# Patient Record
Sex: MALE | Race: WHITE | NOT HISPANIC OR LATINO | Employment: OTHER | URBAN - METROPOLITAN AREA
[De-identification: names, ages, dates, MRNs, and addresses within clinical notes are randomized per-mention and may not be internally consistent; named-entity substitution may affect disease eponyms.]

---

## 2017-03-02 ENCOUNTER — ALLSCRIPTS OFFICE VISIT (OUTPATIENT)
Dept: OTHER | Facility: OTHER | Age: 82
End: 2017-03-02

## 2017-05-08 ENCOUNTER — GENERIC CONVERSION - ENCOUNTER (OUTPATIENT)
Dept: OTHER | Facility: OTHER | Age: 82
End: 2017-05-08

## 2017-05-14 ENCOUNTER — HOSPITAL ENCOUNTER (EMERGENCY)
Facility: HOSPITAL | Age: 82
Discharge: HOME | End: 2017-05-14
Attending: EMERGENCY MEDICINE | Admitting: EMERGENCY MEDICINE
Payer: MEDICARE

## 2017-05-14 VITALS
HEART RATE: 73 BPM | WEIGHT: 197 LBS | TEMPERATURE: 98.3 F | OXYGEN SATURATION: 97 % | SYSTOLIC BLOOD PRESSURE: 137 MMHG | RESPIRATION RATE: 18 BRPM | BODY MASS INDEX: 29.18 KG/M2 | HEIGHT: 69 IN | DIASTOLIC BLOOD PRESSURE: 61 MMHG

## 2017-05-14 DIAGNOSIS — S01.91XA LACERATION OF HEAD: Primary | ICD-10-CM
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
## 2017-05-14 PROCEDURE — 99284 EMERGENCY DEPT VISIT MOD MDM: CPT

## 2017-05-14 RX ORDER — DOXAZOSIN MESYLATE 4 MG/1
4 TABLET ORAL
COMMUNITY

## 2017-05-14 RX ORDER — LIDOCAINE HYDROCHLORIDE AND EPINEPHRINE 10; 10 MG/ML; UG/ML
1 INJECTION, SOLUTION INFILTRATION; PERINEURAL ONCE
Status: COMPLETED | OUTPATIENT
Start: 2017-05-14 | End: 2017-05-14

## 2017-05-14 RX ORDER — BACITRACIN, NEOMYCIN, POLYMYXIN B 400; 3.5; 5 [USP'U]/G; MG/G; [USP'U]/G
1 OINTMENT TOPICAL ONCE
Status: COMPLETED | OUTPATIENT
Start: 2017-05-14 | End: 2017-05-14

## 2017-05-14 RX ORDER — PREDNISONE 10 MG/1
10 TABLET ORAL 2 TIMES DAILY
COMMUNITY

## 2017-05-14 RX ORDER — OMEPRAZOLE 20 MG/1
20 CAPSULE, DELAYED RELEASE ORAL 2 TIMES DAILY
COMMUNITY

## 2017-05-14 RX ORDER — FUROSEMIDE 40 MG/1
40 TABLET ORAL 2 TIMES DAILY
COMMUNITY

## 2017-05-14 RX ORDER — ACETAMINOPHEN 500 MG
500 TABLET ORAL EVERY 6 HOURS PRN
COMMUNITY

## 2017-05-14 RX ADMIN — LIDOCAINE HYDROCHLORIDE,EPINEPHRINE BITARTRATE 1 ML: 10; .01 INJECTION, SOLUTION INFILTRATION; PERINEURAL at 15:37

## 2017-05-14 RX ADMIN — BACITRACIN, NEOMYCIN, POLYMYXIN B 1 SMALL APPLICATION: 400; 3.5; 5 OINTMENT TOPICAL at 15:37

## 2017-05-24 ENCOUNTER — ANESTHESIA (OUTPATIENT)
Dept: GASTROENTEROLOGY | Facility: HOSPITAL | Age: 82
End: 2017-05-24
Payer: MEDICARE

## 2017-05-24 ENCOUNTER — ANESTHESIA EVENT (OUTPATIENT)
Dept: GASTROENTEROLOGY | Facility: HOSPITAL | Age: 82
End: 2017-05-24
Payer: MEDICARE

## 2017-05-24 ENCOUNTER — HOSPITAL ENCOUNTER (OUTPATIENT)
Facility: HOSPITAL | Age: 82
Setting detail: OUTPATIENT SURGERY
Discharge: HOME | End: 2017-05-24
Attending: INTERNAL MEDICINE | Admitting: INTERNAL MEDICINE
Payer: MEDICARE

## 2017-05-24 VITALS
TEMPERATURE: 98 F | OXYGEN SATURATION: 98 % | RESPIRATION RATE: 17 BRPM | HEIGHT: 69 IN | BODY MASS INDEX: 29.18 KG/M2 | HEART RATE: 65 BPM | WEIGHT: 197 LBS | SYSTOLIC BLOOD PRESSURE: 119 MMHG | DIASTOLIC BLOOD PRESSURE: 62 MMHG

## 2017-05-24 LAB — GLUCOSE SERPL-MCNC: 103 MG/DL (ref 65–140)

## 2017-05-24 PROCEDURE — 82948 REAGENT STRIP/BLOOD GLUCOSE: CPT

## 2017-05-24 RX ORDER — PROPOFOL 10 MG/ML
INJECTION, EMULSION INTRAVENOUS CONTINUOUS PRN
Status: DISCONTINUED | OUTPATIENT
Start: 2017-05-24 | End: 2017-05-24 | Stop reason: SURG

## 2017-05-24 RX ORDER — PROPOFOL 10 MG/ML
INJECTION, EMULSION INTRAVENOUS AS NEEDED
Status: DISCONTINUED | OUTPATIENT
Start: 2017-05-24 | End: 2017-05-24 | Stop reason: SURG

## 2017-05-24 RX ORDER — SPIRONOLACTONE 100 MG/1
100 TABLET, FILM COATED ORAL DAILY
COMMUNITY

## 2017-05-24 RX ORDER — SODIUM CHLORIDE 9 MG/ML
125 INJECTION, SOLUTION INTRAVENOUS CONTINUOUS
Status: DISCONTINUED | OUTPATIENT
Start: 2017-05-24 | End: 2017-05-24 | Stop reason: HOSPADM

## 2017-05-24 RX ORDER — HALOPERIDOL 2 MG/ML
1 SOLUTION ORAL EVERY 6 HOURS PRN
COMMUNITY

## 2017-05-24 RX ORDER — LORAZEPAM 0.5 MG/1
0.5 TABLET ORAL EVERY 6 HOURS PRN
COMMUNITY

## 2017-05-24 RX ORDER — BUDESONIDE AND FORMOTEROL FUMARATE DIHYDRATE 160; 4.5 UG/1; UG/1
2 AEROSOL RESPIRATORY (INHALATION) 2 TIMES DAILY
COMMUNITY

## 2017-05-24 RX ORDER — GABAPENTIN 600 MG/1
600 TABLET ORAL 3 TIMES DAILY
COMMUNITY

## 2017-05-24 RX ADMIN — SODIUM CHLORIDE: 0.9 INJECTION, SOLUTION INTRAVENOUS at 09:08

## 2017-05-24 RX ADMIN — PROPOFOL 60 MG: 10 INJECTION, EMULSION INTRAVENOUS at 09:13

## 2017-05-24 RX ADMIN — PROPOFOL 70 MCG/KG/MIN: 10 INJECTION, EMULSION INTRAVENOUS at 09:13

## 2018-01-09 NOTE — MISCELLANEOUS
Assessment    1  Chronic diastolic congestive heart failure (428 32,428 0) (I50 32)   2  Chronic obstructive pulmonary disease (496) (J44 9)   3  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,440 9) (I25 10)   4  Chronic kidney disease (585 9) (N18 9)    Plan  Ambulatory dysfunction    · *1 - [facility] Physician Referral  Consult  Status: Hold For - Scheduling   Requested for: 45NRO4357   Ordered; For: Ambulatory dysfunction; Ordered By: Cj Rollins Performed:  Due: 80NLK5261  Care Summary provided  : Yes  Chronic diastolic congestive heart failure    · 1 Zaire Donis MD, [facility]  (Cardiology) Physician Referral  Consult  Status: Hold For -  Scheduling,Retrospective By Protocol Authorization  Requested for: [id number]   Ordered; For: Chronic diastolic congestive heart failure; Ordered By: Cj Rollins Performed:  Due: 73HWU8407; Last Updated By: Sarah Marina; 6/16/2016 7:26:11 PM  Care Summary provided  : Yes  Chronic kidney disease    · (1) BASIC METABOLIC PROFILE; Status:Active; Requested OIP:[id number];    Perform:[facility]; LQD:67ZHN2391;MLUCJTO; For:Chronic kidney disease; Ordered By:Hossein Fernandez;   · (1) CBC/PLT/DIFF; Status:Active; Requested NEI:65HWT0301;    Perform:[facility]; OJD:44FFY2604;CHUCYSI; For:Chronic kidney disease; Ordered By:Hossein Fernandez;    Discussion/Summary  Discussion Summary:   #1  Chronic diastolic congestive heart failure with acute exacerbation requiring hospitalization and vigorous diuresis  Patient is improved since admission to the hospital and has had some significant decrease in his lower extremity edema  The cardiologists have increased his diarrhetic dose on a daily basis but or concerns at this point in time hard to make sure that there is no significant changes in his kidney function   Patient was given a slip to check on a basic metabolic profile and a CBC to have done early next week prior to him returning to be seen by his nephrologist     #2  Chronic obstructive pulmonary disease  Patient has rhonchi throughout both lung fields and will continue with present inhalers  Patient has had no acute exacerbation recently but is severely limited in his function by his severe pulmonary disease  Patient does have a follow-up appointment planned with his pulmonologist     #3  Atherosclerotic cardiovascular disease  With the patient's continued problems with diastolic failure and a history of coronary artery disease we will arrange for a follow-up visit with his cardiologist in the near future  #4  Chronic kidney disease  As mentioned be checking basic labs eating a CBC and a basic metabolic profile early next week  We will discuss any results that are abnormal or that show a deterioration in his renal function with his nephrologist    Counseling Documentation With Imm: The patient was counseled regarding diagnostic results, instructions for management, risk factor reductions, prognosis, patient and family education, impressions, risks and benefits of treatment options, importance of compliance with treatment  Medication SE Review and Pt Understands Tx: Possible side effects of new medications were reviewed with the patient/guardian today  The treatment plan was reviewed with the patient/guardian  The patient/guardian understands and agrees with the treatment plan      Chief Complaint  Chief Complaint Free Text Note Form: Patient is here for transition of care visit after recent hospitalization   Chief Complaint Chronic Condition [facility]: Patient is here today for follow up of chronic conditions described in HPI  History of Present Illness  TCM Communication [facility]: The patient is being contacted for follow-up after hospitalization and [facility]  He was hospitalized at and [facility]  The date of admission: 6/10/2016, date of discharge: 6/13/2016   Diagnosis: CHF, atherosclerotic cardiovascular disease, chronic obstructive pulmonary disease  He was discharged to home  Medications reviewed and updated today  He scheduled a follow up appointment  Symptoms: weakness, fatigue and shortness of breath  The patient is currently experiencing symptoms  Counseling was provided to the patient and patient's family  Wife  Topics counseled included diagnostic results, instructions for management, risk factor reductions, prognosis, patient and family education, home health agency benefits, activities of daily living and importance of compliance with treatment  Communication performed and completed by Erick Ramos   HPI: Patient is a 40-year-old male with a history of multiple medical problems including chronic obstructive pulmonary disease, atherosclerotic cardiovascular disease, recurrent CHF  Patient was readmitted to the hospital recently with an acute exacerbation of his CHF  Patient had significant shortness of breath at home at rest and was transferred to the hospital for evaluation  Patient was vigorously diuresed and did improve significantly so that he could be discharged to home  The only change in his medications at this point in time aren't increase in his diarrhetic  Patient does not have a follow-up appointment to be seen by his cardiologist and this will be arranged  Patient states he's back to his baseline but still has significant shortness of breath with any exertion  He is very pleased with the results of wound management and treatment of the wound to the great toe on the left stating that is now no longer draining and is feeling significantly  Review of Systems  Complete-Male:   Constitutional: feeling tired, but no fever, not feeling poorly, no recent weight gain, no chills and no recent weight loss  Eyes: eyesight problems, but no eye pain, no dryness of the eyes, eyes not red, no purulent discharge from the eyes and no itching of the eyes     ENT: hoarseness, but no earache, no nosebleeds, no hearing loss and no nasal discharge  Cardiovascular: lower extremity edema, but the heart rate was not slow, no chest pain, no intermittent leg claudication, the heart rate was not fast and no palpitations  Respiratory: shortness of breath, cough and shortness of breath during exertion, but no orthopnea and no wheezing  Gastrointestinal: constipation and History of chronic constipation, but no abdominal pain, no nausea, no vomiting, no diarrhea and no blood in stools  Genitourinary: urinary hesitancy and nocturia, but no dysuria, no genital lesions, no incontinence and no testicular pain  Musculoskeletal: arthralgias and joint stiffness, but no joint swelling, no limb pain, no myalgias and no limb swelling  Integumentary: skin wound and Sacral decubitus, but no rashes, no itching, no dry skin and no skin lesions  Neurological: No compliants of headache, no confusion, no convulsions, no numbness or tingling, no dizziness or fainting, no limb weakness, no difficulty walking  Psychiatric: Is not suicidal, no sleep disturbances, no anxiety or depression, no change in personality, no emotional problems  Endocrine: erectile dysfunction and feelings of weakness, but no proptosis, no muscle weakness, no deepening of the voice and no hot flashes  Hematologic/Lymphatic: a tendency for easy bleeding and a tendency for easy bruising, but no swollen glands and no swollen glands in the neck  ROS Reviewed:   ROS reviewed  Active Problems     1  Abnormal weight gain (783 1) (R63 5)   2  Accidental fall (E888 9) (W19 XXXA)   3  Acute bronchitis (466 0) (J20 9)   4  Acute Non-Q-wave Myocardial Infarction (410 70)   5  Anemia (285 9) (D64 9)   6  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)   7  Angina pectoris (413 9) (I20 9)   8  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,440 9) (I25 10)   9  Asymptomatic carotid artery narrowing without infarction (433 10) (I65 29)   10   Atrial fibrillation (427 31) (I48 91)   11  Benign essential hypertension (401 1) (I10)   12  Bilateral leg edema (782 3) (R60 0)   13  Black tarry stools (578 1) (K92 1)   14  CABG   15  Cellulitis (On Exam)   16  Chest pain (786 50) (R07 9)   17  Chronic diastolic congestive heart failure (428 32,428 0) (I50 32)   18  Chronic kidney disease (585 9) (N18 9)   19  Chronic obstructive pulmonary disease (496) (J44 9)   20  Constipation, chronic (564 00) (K59 09)   21  Depression (311) (F32 9)   22  Diabetic neuropathy, type II diabetes mellitus (250 60,357 2) (E11 40)   23  Drug eruption (693 0) (L27 0)   24  Dysphagia (787 20) (R13 10)   25  Dyspnea (786 09) (R06 00)   26  Edema (782 3) (R60 9)   27  GERD (gastroesophageal reflux disease) (530 81) (K21 9)   28  Hyperkalemia (276 7) (E87 5)   29  Hyperlipidemia (272 4) (E78 5)   30  Hypoglycemia (251 2) (E16 2)   31  Microalbuminuria (791 0) (R80 9)   32  Neuropathy (355 9) (G62 9)   33  Non-healing open wound of right groin (879 5) (S31 103A)   34  Non-proliferative diabetic retinopathy (250 50,362 03) (E11 329)   35  Peripheral vascular disease (443 9) (I73 9)   36  Postoperative state (V45 89) (Z98 89)   37  Prostate disorder (602 9) (N42 9)   38  Puncture Wound Of Foot (892 0)   39  Sacral decubitus ulcer, stage II (449 49,333 18) (L89 152)   40  Secondary hyperparathyroidism (588 81) (N25 81)   41  Sinusitis (473 9) (J32 9)   42  Skin ulcer, chronic (707 9) (L98 499)   43  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)   44  Vitamin D deficiency (268 9) (E55 9)    DMII (diabetes mellitus, type 2) (250 00) (E11 9)       Type 2 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 (250 80) (M09 326)          Past Medical History    1  History of Denial Of Any Significant Medical History   2  History of angina (V12 59) (Z86 79)   3  History of blood transfusion (V15 89) (Z92 89)   4  History of PTCA (V45 82) (Z98 61)   5  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)   6  History of Pulmonary edema (514) (J81 1)    Surgical History    1  History of CABG   2  CABG   3  History of Cataract Surgery   4  History of Cath Stent Placement   5  History of Hernia Repair   6  History of Surgery For Aneurysm   7  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    Family History  Mother    1  No pertinent family history  Spouse    2  Family history of cardiac disorder (V17 49) (Z82 49)  Family History    3  Family history of Cancer   4  Family history of Coronary Artery Disease (V17 49)   5  Family history of Diabetes Mellitus (V18 0)    Social History    · Caffeine use (V49 89) (F15 90)   · Former smoker (D04 89) (R81 487)   · Marital History - Currently    · No drug use   · Stopped Drinking Alcohol    Current Meds   1  Advair Diskus 250-50 MCG/DOSE Inhalation Aerosol Powder Breath Activated; INHALE 1   PUFF TWICE DAILY  RINSE MOUTH AFTER USE; Therapy: 92Fut9824 to (Evaluate:62Dfd2196) Recorded   2  Albuterol Sulfate (2 5 MG/3ML) 0 083% Inhalation Nebulization Solution; USE 1 UNIT   DOSE VIA NEBULIZER  4 TIMES A DAY AS NEEDED Recorded   3  AmLODIPine Besylate 2 5 MG Oral Tablet; Take 1 tablet daily; Therapy: 28NKD4768 to (Evaluate:65Alu1169)  Requested for: 37Cjc0091; Last   Rx:17Bbm6929 Ordered   4  Aspirin Low Dose 81 MG TABS; Take 1 tablet daily Recorded   5  Atorvastatin Calcium 40 MG Oral Tablet; TAKE 1 TABLET DAILY AT BEDTIME; Therapy: 67YOX7915 to (Evaluate:05Jan2017)  Requested for: 18ZOU9491; Last   Rx:11Jan2016 Ordered   6  Centrum Silver Oral Tablet; Therapy: (Recorded:62Rhs6000) to Recorded   7  Cinnamon TABS; take one tablet daily; Therapy: (Recorded:19Ocz4447) to Recorded   8  Citalopram Hydrobromide 10 MG Oral Tablet; TAKE 1 TABLET AT BEDTIME; Therapy: 80ZIH3986 to (Last ZW:32BWW0350)  Requested for: 37NAO9006 Ordered   9  Citalopram Hydrobromide 10 MG Oral Tablet; TAKE 1 TABLET AT BEDTIME;    Therapy: 59MQY2883 to (Last Rx:75Hlm6165)  Requested for: 83YDZ3665 Ordered   10  Clopidogrel Bisulfate 75 MG Oral Tablet; Take 1 tablet daily; Therapy: 42JKV6393 to (95 095240)  Requested for: 76AHF4276; Last    Rx:73Rnl2858 Ordered   11  Daliresp 500 MCG Oral Tablet; TAKE 1 TABLET DAILY; Therapy: (Recorded:87Bmv6573) to Recorded   12  FerrouSul 325 (65 Fe) MG Oral Tablet; TAKE 1 TABLET TWICE DAILY WITH MEALS; Therapy: 12OSF0894 to (Anna Marie Rodriguez)  Requested for: 51YUP4474; Last    Rx:07Wzw9922 Ordered   13  Fish Oil 1200 MG Oral Capsule; Therapy: (Recorded:62Fbv4349) to Recorded   14  Isosorbide Mononitrate ER 30 MG Oral Tablet Extended Release 24 Hour; TAKE 1    TABLET DAILY; Therapy: (Recorded:03Mar2016) to Recorded   15  Lantus SoloStar 100 UNIT/ML Subcutaneous Solution Pen-injector; Inject 35 units at    bedtime as directed by doctor; Therapy: (Recorded:02Hpb1716) to Recorded   16  Metoprolol Tartrate 50 MG Oral Tablet; take 1/2 tablet by mouth twice a dayl; Therapy: 28Mar2011-(Evaluate:43Wfe7766)  Requested for: 43Jek8699 Ordered   17  Multi-betic Diabetes Oral Tablet; Therapy: (Recorded:10Wog8907) to Recorded   18  Nitrostat 0 4 MG Sublingual Tablet Sublingual; PLACE 1 TABLET UNDER THE TONGUE    EVERY 5 MINUTES FOR UP TO 3 DOSES AS NEEDED FOR CHEST PAIN  CALL    911 IF PAIN PERSISTS; Therapy: 42EKR9986 to (Verónica Blake)  Requested for: 05Apr2016; Last    Rx:05Apr2016 Ordered   19  NovoLOG FlexPen 100 UNIT/ML Subcutaneous Solution Pen-injector; imject 14 units    before breakfast and lunch and 14 before dinner; Therapy: 68YNX7870 to ([facility])  Requested for: 19VRD5428; Last    Rx:86Bbu0998 Ordered   20  NovoLOG FlexPen 100 UNIT/ML Subcutaneous Solution Pen-injector; Inject 12 units @    breakfastk, 12u at lunch and 14u at supper as directed by your    doctor; Therapy: (Recorded:88Ifj3258) to Recorded   21  Pantoprazole Sodium 40 MG Oral Tablet Delayed Release; Take 1 tablet twice daily;     Therapy: 89LQN9226 to (Evaluate:42Cyl6010)  Requested for: 57SIY4560; Last    Rx:42Rry9041 Ordered   22  Spiriva HandiHaler 18 MCG Inhalation Capsule; USE AS DIRECTED; Therapy: 98Gbu7669 to Recorded   23  Tamsulosin HCl - 0 4 MG Oral Capsule; TAKE ONE CAPSULE BY MOUTH AT BEDTIME; Therapy: (Recorded:60Aoo6684) to Recorded   24  Terazosin HCl - 2 MG Oral Capsule; TAKE 2 CAPSULES AT BEDTIME; Therapy: 68NKY5689 to (Lester Navarro)  Requested for: 77POX1981; Last    Rx:88Pju2756 Ordered   25  Torsemide 20 MG Oral Tablet; Take 1 tablet daily; Therapy: 69Laf8986-(Last:11Yhc0820) Ordered   26  Vitamin B-12 TABS; 2,000 mcg daily; Therapy: (Recorded:12Gkw5303) to Recorded   27  Vitamin D3 1000 UNIT Oral Tablet; TAKE 4 TABLET Daily; Last DY:55WJC5699 Ordered    Allergies    1  No Known Drug Allergies    2  No Known Environmental Allergies   3  No Known Food Allergies    Vitals  Signs [Data Includes: Current Encounter]   Recorded: 44YJL2833 06:45PM   Temperature: 98 4 F  Heart Rate: 89  Respiration: 20  Systolic: 461  Diastolic: 66  Height: 5 ft 10 in  Weight: 189 lb 6 08 oz  BMI Calculated: 27 17  BSA Calculated: 2 04  O2 Saturation: 94    Physical Exam    Constitutional   General appearance: Abnormal   Pleasant 55-year-old male who is awake alert  Eyes   Conjunctiva and lids: No swelling, erythema, or discharge  Pupils and irises: Equal, round and reactive to light  Ears, Nose, Mouth, and Throat   External inspection of ears and nose: Normal     Otoscopic examination: Tympanic membrance translucent with normal light reflex  Canals patent without erythema  Nasal mucosa, septum, and turbinates: Normal without edema or erythema  Oropharynx: Abnormal   Slightly erythematous posterior airway with a whitish postnasal drip  Pulmonary   Respiratory effort: No increased work of breathing or signs of respiratory distress  Some mild chronic shortness of breath with exertion     Auscultation of lungs: Abnormal  Decreased breath sounds and air flow with rhonchi heard both anterior and posteriorly which are faint, he states these improve after respiratory treatments  Cardiovascular   Palpation of heart: Normal PMI, no thrills  Auscultation of heart: Normal rate and rhythm, normal S1 and S2, without murmurs  Examination of extremities for edema and/or varicosities: Abnormal   Trace edema bilateral lower extremities which is improved since his last visit  Carotid pulses: Abnormal   Bilateral carotid bruits  Abdomen   Abdomen: Abnormal   Obese soft nontender with positive decreased bowel sounds Ã4 quadrants  Liver and spleen: No hepatomegaly or splenomegaly  Lymphatic   Palpation of lymph nodes in neck: No lymphadenopathy  Musculoskeletal   Gait and station: Abnormal   Antalgic gait walking with a boot on left foot for nonhealing ulcer  Digits and nails: Abnormal   Diffuse arthritic changes hands and digits  Inspection/palpation of joints, bones, and muscles: Abnormal   Diffuse osteoarthritis, tenderness and ecchymosis but no deformity to the right chest wall in the mid axillary line to the area of T6-T10  Skin   Skin and subcutaneous tissue: Normal without rashes or lesions  Examination of the skin for lesions: Abnormal   Patient states that these had significant healing to the wound of his great toe on the left and speaking seen by wound management  Neurologic   Cranial nerves: Cranial nerves 2-12 intact  Reflexes: Abnormal   Absent patellar and Achilles deep tendon reflexes  Sensation: Abnormal     Psychiatric   Orientation to person, place and time: Normal     Mood and affect: Abnormal   Flat but pleasant mood today  Diabetic Foot Screen: Abnormal        Future Appointments    Date/Time Provider Specialty Site   09/01/2016 09:30 AM Cassy Vincent DO Internal Medicine [facility] INTERNAL MED   07/14/2016 03:00 PM JASSON Jon   Nephrology [facility]   08/22/2016 09:45 AM Karen Zayas Endocrinology [facility]   07/19/2016 09:30 AM Sari Payne, [facility] Neurology [address] [place]     Signatures   Electronically signed by : Josesito Santos OM; Jun 14 2016 12:31PM EST                       (Author)    Electronically signed by : Tarun Nicole DO; Jun 17 2016  7:26AM EST                       (Author)

## 2018-01-09 NOTE — PROGRESS NOTES
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)   3  Diabetic neuropathy, type II diabetes mellitus (250 60,357 2) (E11 40)   4  Hyperlipidemia (272 4) (E78 5)   5  Neuropathy (355 9) (G62 9)    Plan   Follow-up visit in 6 months Evaluation and Treatment  Follow-up in 6 months with Dr Bebeto Lomax  Status: Hold For - Scheduling  Requested for: 64SYR8473  Ordered; For: History of CVA (cerebrovascular accident); Ordered By: Yariel Aguilar  Performed:   Due: 16IZS5524     Discussion/Summary  Discussion Summary:   Stroke: Didi Arndt is doing well since he was last seen  He continues on plavix, ASA and Lipitor and is tolerating that well  He is active and should remain so  He did have a recent fall  Since he is on duel therapy and has a hx of aneurysm if he were to fall and strike his head he should return to the ER to be evaluated  He recently had carotid Doppers done and they remain unchanged since last study, I did review the results with him today  Afib/intracranial aneurysm: Didi Arndt remains off of anti-coagulation at this time  He should continue to follow up with his cardiologist  He does have a history of intracranial aneurysm and he had a recent MRA and it was stable  I also discussed those results with him as well  He was followed by Dr Judi Sheikh in the past     Neuropathy: Didi Arndt does continue with length dependant neuropathy bilateral lower extremities but it is not painful for him  He should check his feet daily especially after being out in the yard  We discussed how his loss of sensation to the bilateral feet also put him at risk for falls as well  Didi Arndt should follow up with Dr Bebeto Lomax in 6 months but if he has any questions or concerns he can call us      If he were to have any new stroke like symptoms such as sudden loss of vision, weakness on one side of the body, slurred speech or dizziness he should return to the ER or call 911  Chief Complaint  Chief Complaint Free Text Note Form: Patient present for neurology follow up for stroke  History of Present Illness  HPI: Kumar Richter presents today for follow up  He reports that he is doing well  He continues to do well on ASA and Lipitor  He still is not on any anti-coagulation and he does not want to go back on it  He does have a hx of anterior aneurysm as well  He has not had any new stroke like symptoms since last being seen  He reports that he gets about 7 hours of sleep a day  He states that his mind does not always let him sleep at night  He denies any depression or anxiety  He reports that his legs are not painful and that he does loss of sensation  He did fall in June and was evaluated for that with a CTA as he was having neck pain  He denies any other falls since then  He reports that he works in his yard daily and is active  Review of Systems  Neurological ROS:   Constitutional: no fever, no chills, no recent weight gain, no recent weight loss, no complaints of feeling tired, no changes in appetite  HEENT:  no sinus problems, not feeling congested, no blurred vision, no dryness of the eyes, no eye pain, no hearing loss, no tinnitus, no mouth sores, no sore throat, no hoarseness, no dysphagia, no masses, no bleeding  Cardiovascular:  no chest pain or pressure, no palpitations present, the heart rate was not rapid or irregular, no swelling in the arms or legs, no poor circulation  Respiratory:  no unusual or persistant cough, no shortness of breath with or without exertion  Gastrointestinal:  no nausea, no vomiting, no diarrhea, no abdominal pain, no changes in bowel habits, no melena, no loss of bowel control  Genitourinary:  no incontinence, no feelings of urinary urgency, no increase in frequency, no urinary hesitancy, no dysuria, no hematuria     Musculoskeletal:  no arthralgias, no myalgias, no immobility or loss of function, no head/neck/back pain, no pain while walking  Integumentary  no masses, no rash, no skin lesions, no livedo reticularis  Psychiatric:  no anxiety, no depression, no mood swings, no psychiatric hospitalizations, no sleep problems  Endocrine  no unusual weight loss or gain, no excessive urination, no excessive thirst, no hair loss or gain, no hot or cold intolerance, no menstrual period change or irregularity, no loss of sexual ability or drive, no erection difficulty, no nipple discharge  Hematologic/Lymphatic:  no unusual bleeding, no tendency for easy bruising, no clotting skin or lumps  Neurological General:  no headache, no nausea or vomiting, no lightheadedness, no convulsions, no blackouts, no syncope, no trauma, no photopsia, no increased sleepiness, no trouble falling asleep, no snoring, no awakening at night  Neurological Mental Status:  no confusion, no mood swings, no alteration or loss of consciousness, no difficulty expressing/understanding speech, no memory problems  Neurological Cranial Nerves:  no blurry or double vision, no loss of vision, no face drooping, no facial numbness or weakness, no taste or smell loss/changes, no hearing loss or ringing, no vertigo or dizziness, no dysphagia, no slurred speech  Neurological Motor findings include:  no tremor, no twitching, no cramping(pre/post exercise), no atrophy  Neurological Coordination:  no unsteadiness, no vertigo or dizziness, no clumsiness, no problems reaching for objects  Neurological Sensory:  no numbness, no pain, no tingling, does not fall when eyes closed or taking a shower  Neurological Gait:  no difficulty walking, not falling to one side, no sensation of being pushed, has not had falls  ROS Reviewed:   ROS reviewed  Active Problems    1  Abnormal weight gain (783 1) (R63 5)   2  Accidental fall (E888 9) (W19 XXXA)   3  Acute bronchitis (466 0) (J20 9)   4  Acute Non-Q-wave Myocardial Infarction (410 70)   5   Ambulatory dysfunction (719 7) (R26 2)   6  Anemia (285 9) (D64 9)   7  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)   8  Angina pectoris (413 9) (I20 9)   9  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,440 9) (I25 10)   10  Asymptomatic carotid artery narrowing without infarction (433 10) (I65 29)   11  Atrial fibrillation (427 31) (I48 91)   12  Benign essential hypertension (401 1) (I10)   13  Bilateral leg edema (782 3) (R60 0)   14  Black tarry stools (578 1) (K92 1)   15  CABG   16  Cellulitis (On Exam)   17  Chest pain (786 50) (R07 9)   18  Chronic diastolic congestive heart failure (428 32,428 0) (I50 32)   19  Chronic kidney disease (585 9) (N18 9)   20  Chronic obstructive pulmonary disease (496) (J44 9)   21  CKD (chronic kidney disease), stage III (585 3) (N18 3)   22  Constipation, chronic (564 00) (K59 09)   23  Depression (311) (F32 9)   24  Diabetic neuropathy, type II diabetes mellitus (250 60,357 2) (E11 40)   25  DMII (diabetes mellitus, type 2) (250 00) (E11 9)   26  Drug eruption (693 0) (L27 0)   27  Dysphagia (787 20) (R13 10)   28  Dyspnea (786 09) (R06 00)   29  Edema (782 3) (R60 9)   30  GERD (gastroesophageal reflux disease) (530 81) (K21 9)   31  Hyperkalemia (276 7) (E87 5)   32  Hyperlipidemia (272 4) (E78 5)   33  Hypoglycemia (251 2) (E16 2)   34  Microalbuminuria (791 0) (R80 9)   35  Neuropathy (355 9) (G62 9)   36  Non-healing open wound of right groin (879 5) (S31 103A)   37  Non-proliferative diabetic retinopathy (250 50,362 03) (E11 329)   38  Peripheral vascular disease (443 9) (I73 9)   39  Postoperative state (V45 89) (Z98 89)   40  Prostate disorder (602 9) (N42 9)   41  Puncture Wound Of Foot (892 0)   42  Sacral decubitus ulcer, stage II (684 97,135 38) (L89 152)   43  Secondary hyperparathyroidism (588 81) (N25 81)   44  Sinusitis (473 9) (J32 9)   45  Skin ulcer, chronic (707 9) (L93 399)   46   Type 2 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 (250 80,707 15) (E11 621,L97 529)   47  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)   48  Vitamin D deficiency (268 9) (E55 9)    Past Medical History    1  History of Denial Of Any Significant Medical History   2  History of angina (V12 59) (Z86 79)   3  History of blood transfusion (V15 89) (Z92 89)   4  History of PTCA (V45 82) (Z98 61)   5  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)   6  History of Pulmonary edema (514) (J81 1)  Active Problems And Past Medical History Reviewed: The active problems and past medical history were reviewed and updated today  Surgical History    1  History of CABG   2  CABG   3  History of Cataract Surgery   4  History of Cath Stent Placement   5  History of Hernia Repair   6  History of Surgery For Aneurysm   7  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  Surgical History Reviewed: The surgical history was reviewed and updated today  Family History  Mother    1  Family history of Diabetes Mellitus (V18 0)  Sister    2  Family history of malignant neoplasm (V16 9) (Z80 9)  Spouse    3  Family history of cardiac disorder (V17 49) (Z82 49)  Family History    4  Family history of Cancer   5  Family history of Coronary Artery Disease (V17 49)   6  Family history of Diabetes Mellitus (V18 0)  Family History Reviewed: The family history was reviewed and updated today  Social History    · Denied: History of Always uses seat belt   · Caffeine use (V49 89) (F15 90)   · Daily caffeine consumption, 1 serving a day   · Does not exercise (V69 0) (Z72 3)   · Former smoker (N41 05) (M23 320)   · Marital History - Currently    · No drug use   · Stopped Drinking Alcohol  Social History Reviewed: The social history was reviewed and updated today  The social history was reviewed and is unchanged  Current Meds   1  Advair Diskus 250-50 MCG/DOSE Inhalation Aerosol Powder Breath Activated; INHALE 1   PUFF TWICE DAILY   RINSE MOUTH AFTER USE; Therapy: 67Eka1371 to (Evaluate:86Tup2471) Recorded   2  Albuterol Sulfate (2 5 MG/3ML) 0 083% Inhalation Nebulization Solution; USE 1 UNIT   DOSE VIA NEBULIZER  4 TIMES A DAY AS NEEDED Recorded   3  AmLODIPine Besylate 2 5 MG Oral Tablet; Take 1 tablet daily; Therapy: 03EEJ6801 to (Evaluate:81Oxn8836)  Requested for: 11Sct2984; Last   Rx:46Fwx5389 Ordered   4  Aspirin Low Dose 81 MG TABS; Take 1 tablet daily Recorded   5  Atorvastatin Calcium 40 MG Oral Tablet; TAKE 1 TABLET DAILY AT BEDTIME; Therapy: 63JIL0404 to (Evaluate:05Jan2017)  Requested for: 96UVM8653; Last   Rx:11Jan2016 Ordered   6  Centrum Silver Oral Tablet; TAKE 1 TABLET DAILY; Therapy: (Recorded:49Pfh9924) to Recorded   7  Cinnamon TABS; take one tablet daily; Therapy: (Recorded:27Gel1323) to Recorded   8  Citalopram Hydrobromide 10 MG Oral Tablet; TAKE 1 TABLET AT BEDTIME; Therapy: 02VYT8001 to (Last RD:53KJM8700)  Requested for: 14SWY6515 Ordered   9  Clopidogrel Bisulfate 75 MG Oral Tablet; Take 1 tablet daily; Therapy: 38KWD3894 to (Rosa M Antony)  Requested for: 13SNZ6002; Last   Rx:79Taq5185 Ordered   10  Daliresp 500 MCG Oral Tablet; TAKE 1 TABLET DAILY; Therapy: (Recorded:32Ise5900) to Recorded   11  FerrouSul 325 (65 Fe) MG Oral Tablet; TAKE 1 TABLET TWICE DAILY WITH MEALS; Therapy: 38VVP3981 to (Hayes Anderson)  Requested for: 68JBZ6260; Last    Rx:10Foq7844 Ordered   12  Fish Oil 1200 MG Oral Capsule; Therapy: (Recorded:57Bwn4011) to Recorded   13  Isosorbide Mononitrate ER 30 MG Oral Tablet Extended Release 24 Hour; TAKE 1    TABLET DAILY; Therapy: (Recorded:03Mar2016) to Recorded   14  Lantus SoloStar 100 UNIT/ML Subcutaneous Solution Pen-injector; Inject 35 units at    bedtime as directed by doctor; Therapy: (Recorded:75Wqf2607) to Recorded   15  Metoprolol Tartrate 50 MG Oral Tablet; take 1/2 tablet by mouth twice a dayl;     Therapy: 28Mar2011-(Evaluate:09Apr2017)  Requested for: 14Apr2016 Ordered 16  Multi-betic Diabetes Oral Tablet; Therapy: (Recorded:12Crv5410) to Recorded   17  Nitrostat 0 4 MG Sublingual Tablet Sublingual; PLACE 1 TABLET UNDER THE TONGUE    EVERY 5 MINUTES FOR UP TO 3 DOSES AS NEEDED FOR CHEST PAIN  CALL    911 IF PAIN PERSISTS; Therapy: 45QCA0904 to (Lyndsey Elizabeth)  Requested for: 63Vwl9206; Last    Rx:63Hkl2454 Ordered   18  NovoLOG FlexPen 100 UNIT/ML Subcutaneous Solution Pen-injector; imject 14 units    before breakfast and lunch and 14 before dinner; Therapy: 93CEK0810 to (Teri Osman)  Requested for: 92TEG0088; Last    Rx:61Mgy8071 Ordered   19  NovoLOG FlexPen 100 UNIT/ML Subcutaneous Solution Pen-injector; Inject 12 units @    breakfastk, 12u at lunch and 14u at supper as directed by your    doctor; Therapy: (Recorded:92Ywv6022) to Recorded   20  Pantoprazole Sodium 40 MG Oral Tablet Delayed Release; Take 1 tablet twice daily; Therapy: 59UQF9424 to (Evaluate:80Qyk5190)  Requested for: 05Klr4782; Last    Rx:51Gnj6741 Ordered   21  Spiriva HandiHaler 18 MCG Inhalation Capsule; USE AS DIRECTED; Therapy: 62Jbg7340 to Recorded   22  Tamsulosin HCl - 0 4 MG Oral Capsule; TAKE ONE CAPSULE BY MOUTH AT BEDTIME; Therapy: (Recorded:31Dqk3427) to Recorded   23  Terazosin HCl - 2 MG Oral Capsule; TAKE 2 CAPSULES AT BEDTIME; Therapy: 17IZE7212 to (Susana Canchola)  Requested for: 90HBH1415; Last    Rx:16Luf8563 Ordered   24  Torsemide 20 MG Oral Tablet; TAKE 1 TABLET DAILY; Therapy: (Recorded:88Slh6255) to  Requested for: 64RPE8484 Recorded   25  Vitamin B-12 TABS; 2,000 mcg daily; Therapy: (Recorded:68Uts0028) to Recorded   26  Vitamin D3 1000 UNIT Oral Tablet; TAKE 4 TABLET Daily; Last DN:46XHY3614 Ordered  Medication List Reviewed: The medication list was reviewed and updated today  Allergies    1  No Known Drug Allergies    2  No Known Environmental Allergies   3   No Known Food Allergies    Vitals  Signs   Recorded: 16RLR8971 09:46AM Systolic: 590, LUE, Sitting  Diastolic: 54, LUE, Sitting  Heart Rate: 78, R Radial  Pulse Quality: Normal, R Radial  Respiration Quality: Normal  Respiration: 18  Height: 5 ft 9 in  Weight: 189 lb 8 oz  BMI Calculated: 27 98  BSA Calculated: 2 02    Physical Exam  Gen:  well developed/well nourished, pleasant elderly male who appears in no acute distress  Head:  AT/NC  Eyes:  sclera and conjunctiva clear  ENT: Normal appearing oropharynx  Neck: Supple  CV:  RRR  Resp: CTABL  Abd:  soft, non-tender, non-distended, bowel sounds present  Skin:  no significant skin lesions  Musculoskeletal:  no bony abnormalities  Neuro exam:  The patient is alert and oriented, with appropriate mental status and language  Gait is steady with no shuffling  Romberg was positive  Cranial nerves II-XII intact and symmetric bilaterally  Motor testing reveals 5/5 strength throughout with no drift  Coordination testing was unremarkable  Sensation diminished to the bilateral lower extremities length dependant to the shins to temp, touch and pin prick  DTRs were +2 and symmetric  Results/Data  Diagnostic Studies Reviewed: I personally reviewed the films/images/results in the office today  My interpretation follows  CT Scan Review CTA 6/20 s/p fall and neck pain:  1  Moderate focal right ICA stenosis at the carotid bifurcation measures approximately 60%  2  Superiorly directed anterior communicating region aneurysm with dome height of 8 mm  Neck measuring approximately 4 1 mm with transverse diameter of 3 5 mm  This finding is similar to the prior CT angiogram   3  No focal intracranial stenosis  4  Small bilateral pleural effusions  MRI Review MRA 6/7: 7 to 8 mm bilobed anterior communicating artery aneurysm intimately associated with the origins of the A2 segments bilaterally, redemonstrated  Hypoplastic versus absent A1 segment of the right anterior cerebral artery is stable   No new aneurysm or interval increase in size of the previously present anterior communicating artery aneurysm  Ultrasound RIGHT:  There is 50-69% stenosis noted in the internal carotid artery  Plaque is  heterogenous, calcific and irregular  Vertebral artery flow is antegrade  There is no significant subclavian artery disease  LEFT:  There is 50-69% stenosis noted in the internal carotid artery  Plaque is  heterogenous, calcific and irregular  Vertebral artery flow is antegrade  There is no significant subclavian artery disease  Compared to previous study on 12/13/2015 , there is no change in arterial    perfusion ot the carotid arteries bilaterally  Recommend repeat testing in 6 month as per protocol unless otherwise indicated        Future Appointments    Date/Time Provider Specialty Site   01/27/2017 09:30 AM Poncho Thomas MD Neurology [place] [address]   09/01/2016 09:30 AM Zuly Juarez DO Internal Medicine [facility] INTERNAL MED   08/22/2016 09:45 AM Pricness Mckinney Endocrinology [facility] MEDICAL [facility]     Signatures   Electronically signed by : Sheri Cox [facility]; Jul 19 2016 10:40AM EST                       (Author)    Electronically signed by : Sheri Cox, [facility]; Jul 19 2016 10:43AM EST                       (Author)    Electronically signed by : Cherry Lund MD; Jul 26 2016  8:39PM EST                       (Co-author)

## 2018-01-10 NOTE — MISCELLANEOUS
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)   8  Angina pectoris (413 9) (I20 9)   9  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,440 9) (I25 10)   10  Asymptomatic carotid artery narrowing without infarction (433 10) (I65 29)   11  Atrial fibrillation (427 31) (I48 91)   12  Benign essential hypertension (401 1) (I10)   13  Bilateral leg edema (782 3) (R60 0)   14  Black tarry stools (578 1) (K92 1)   15  CABG   16  Cellulitis (On Exam)   17  Chest pain (786 50) (R07 9)   18  Chronic diastolic congestive heart failure (428 32,428 0) (I50 32)   19  Chronic kidney disease (585 9) (N18 9)   20  Chronic obstructive pulmonary disease (496) (J44 9)   21  CKD (chronic kidney disease), stage III (585 3) (N18 3)   22  Constipation, chronic (564 00) (K59 09)   23  Depression (311) (F32 9)   24  Diabetic neuropathy, type II diabetes mellitus (250 60,357 2) (E11 40)   25  DMII (diabetes mellitus, type 2) (250 00) (E11 9)   26  Drug eruption (693 0) (L27 0)   27  Dysphagia (787 20) (R13 10)   28  Dyspnea (786 09) (R06 00)   29  Edema (782 3) (R60 9)   30  GERD (gastroesophageal reflux disease) (530 81) (K21 9)   31  History of CVA (cerebrovascular accident) (V12 54) (Z86 73)   32  Hyperkalemia (276 7) (E87 5)   33  Hyperlipidemia (272 4) (E78 5)   34  Hypoglycemia (251 2) (E16 2)   35  Microalbuminuria (791 0) (R80 9)   36   Need for influenza vaccination (V04 81) (Z23)   40  Neuropathy (355 9) (G62 9)   38  Non-healing open wound of right groin (879 5) (S31 103A)   39  Non-proliferative diabetic retinopathy (250 50,362 03) (E11 329)   40  Peripheral vascular disease (443 9) (I73 9)   41  Postoperative state (V45 89) (Z98 89)   42  Prostate disorder (602 9) (N42 9)   43  Puncture Wound Of Foot (892 0)   44  Sacral decubitus ulcer, stage II (586 39,192 42) (L89 152)   45  Secondary hyperparathyroidism (588 81) (N25 81)   46  Sinusitis (473 9) (J32 9)   47  Skin ulcer, chronic (707 9) (L98 499)   48  Type 2 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 (250 80,707 15) (E11 621,L97 529)   49  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)   50  Vitamin D deficiency (268 9) (E55 9)    Current Meds   1  Advair Diskus 250-50 MCG/DOSE Inhalation Aerosol Powder Breath Activated; INHALE   1 PUFF TWICE DAILY  RINSE MOUTH AFTER USE; Therapy: 61Qny2261 to (Evaluate:78Tzx3553) Recorded   2  Albuterol Sulfate (2 5 MG/3ML) 0 083% Inhalation Nebulization Solution; USE 1 UNIT   DOSE VIA NEBULIZER  4 TIMES A DAY AS NEEDED Recorded   3  AmLODIPine Besylate 2 5 MG Oral Tablet; Take 1 tablet daily; Therapy: 50XYG0580 to (Evaluate:11Sep2017)  Requested for: 48Uyi4382; Last   Rx:00Yov8945 Ordered   4  AmLODIPine Besylate 2 5 MG Oral Tablet; TAKE 1 TABLET DAILY; Therapy: 20AMK4466 to (Evaluate:06Jun2017); Last Rx:72Jlb1622 Ordered   5  Aspirin Low Dose 81 MG TABS; Take 1 tablet daily Recorded   6  Atorvastatin Calcium 40 MG Oral Tablet; TAKE 1 TABLET DAILY AT BEDTIME; Therapy: 04ZUF3731 to (Evaluate:05Jan2017)  Requested for: 20IUX8210; Last   Rx:11Jan2016 Ordered   7  BD Pen Needle Mae U/F 32G X 4 MM Miscellaneous; four times daily; Therapy: 16WLP6978 to (Evaluate:13Nov2016)  Requested for: 45Gvu3116; Last   Rx:92Gwh5405 Ordered   8  Centrum Silver Oral Tablet; TAKE 1 TABLET DAILY; Therapy: (Recorded:72Amw0052) to Recorded   9   Cinnamon 500 MG Oral Tablet; 4 tablets daily; Therapy: (Campos Jerome) to Recorded   10  Citalopram Hydrobromide 10 MG Oral Tablet; TAKE 1 TABLET AT BEDTIME; Therapy: 23OLI4684 to (Last BENJY:49ANK0400)  Requested for: 11NLH3266 Ordered   11  Clopidogrel Bisulfate 75 MG Oral Tablet; Take 1 tablet daily; Therapy: 87XAR1423 to (0487 72 23 66)  Requested for: 57VWW1368; Last    Rx:41Itk5532 Ordered   12  Daliresp 500 MCG Oral Tablet; TAKE 1 TABLET DAILY; Therapy: (Recorded:98Fjd4001) to Recorded   13  FerrouSul 325 (65 Fe) MG Oral Tablet; TAKE 1 TABLET DAILY; Therapy: (Campos Jerome) to Recorded   14  Fish Oil 1200 MG Oral Capsule; three times daily; Therapy: (Campos Jerome) to Recorded   15  Isosorbide Mononitrate ER 30 MG Oral Tablet Extended Release 24 Hour; TAKE 1    TABLET DAILY; Therapy: (Recorded:03Mar2016) to Recorded   16  Lantus SoloStar 100 UNIT/ML Subcutaneous Solution Pen-injector; Inject 35 units at    bedtime as directed by doctor; Therapy: (Recorded:32Ebe9766) to Recorded   17  Metoprolol Tartrate 50 MG Oral Tablet; take 1/2 tablet by mouth twice a dayl; Therapy: 28Mar2011-(Evaluate:09Apr2017)  Requested for: 14Apr2016 Ordered   18  Metoprolol Tartrate 50 MG Oral Tablet; take 1/2 tablet twice daily; Therapy: 63VGO8655 to (Evaluate:06Jun2017); Last Rx:42Dmt6154 Ordered   19  Multi-betic Diabetes Oral Tablet; TAKE 1 TABLET DAILY; Therapy: (Campos Jerome) to Recorded   20  NovoLOG FlexPen 100 UNIT/ML Subcutaneous Solution Pen-injector; Inject 12 units @    breakfastk, 12u at lunch and 14u at supper as directed by your    doctor; Therapy: (Recorded:06Cjx7059) to Recorded   21  Pantoprazole Sodium 40 MG Oral Tablet Delayed Release; Take 1 tablet twice daily; Therapy: 09MNJ6817 to (Evaluate:10Dpx5887)  Requested for: 51Tcs3208; Last    Rx:98Rat2126 Ordered   22  Spiriva HandiHaler 18 MCG Inhalation Capsule; USE AS DIRECTED; Therapy: 44Odo7590 to Recorded   23   Tamsulosin HCl - 0 4 MG Oral Capsule; TAKE ONE CAPSULE BY MOUTH AT BEDTIME; Therapy: (Recorded:64Aui8109) to Recorded   24  Terazosin HCl - 2 MG Oral Capsule; TAKE 2 CAPSULES AT BEDTIME; Therapy: 20OCV6246 to (Evaluate:22Exb7217)  Requested for: 43Yym6720; Last    Rx:32Bmk0801 Ordered   25  Torsemide 20 MG Oral Tablet; Take 1 tablet daily; Therapy: (Hardik Castañeda) to  Requested for: 62Hec7902 Recorded   26  Vitamin B-12 TABS; 2,000 mcg daily; Therapy: (Recorded:88Byg2331) to Recorded   27  Vitamin D3 2000 UNIT Oral Capsule; 2 daily equals 4000 iu daily; Therapy: (Recorded:76Gst5959) to Recorded    Allergies    1  No Known Drug Allergies    2  No Known Environmental Allergies   3   No Known Food Allergies    Signatures   Electronically signed by : Aram Adame, ; Sep 19 2016 12:35PM EST                       (Author)

## 2018-01-10 NOTE — CONSULTS
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 9) (I25 10)   3  Asymptomatic carotid artery narrowing without infarction (433 10) (I65 29)   4  Atrial fibrillation (427 31) (I48 91)   5  Benign essential hypertension (401 1) (I10)   6  CABG   7  Black tarry stools (578 1) (K92 1)   8  Chronic kidney disease (585 9) (N18 9)   9  DMII (diabetes mellitus, type 2) (250 00) (E11 9)   10  Hyperlipidemia (272 4) (E78 5)    Discussion/Summary  Discussion Summary:   Bilateral hand spasm: The episode that Mr Teri Mackey describes is most consistent with a bilateral hand spasm  This could be due to diabetic neuropathy and/or a significant electrolyte imbalance  It occurred at the end of the night of very poor sleep as well  It is not really consistent with TIA considering the bilateral nature of the events as well as the positive nature of the symptoms (spasm as opposed to weakness)  Furthermore, a seizure would not have affected both of his hands without causing additional increased tone and spasm and alteration of his level of consciousness  If he were post ictal following a seizure that should also cause weakness rather than stiffness   -Ultimately, I would suggest that this can be monitored moving forward clinically   -He does have a mild resting predominant tremor which is more significant on the left side than the right, but has no other signs of parkinsonism either by history or exam  Ultimately I would not treat his tremor with dopaminergic medications at this point in time  If his tremor does worsen and began to affect his life or if he begins to experience significant difficulty walking difficulty with eating, or other signs or symptoms of parkinsonism it may be reasonable to consider treating his symptoms in the future  Cerebrovascular risk: Mr Teri Mackey does have significant risk factors for ischemic stroke   He is currently maintained on a combination of antiplatelet medications, statin, and blood pressure control  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 and has seen Dr Abeba Tripathi for this  The decision has been made to monitor the aneurysm moving forward for the time being due to the high risk nature of any intervention at this point   -Considering his history of atrial fibrillation and other cardiovascular and cerebrovascular risk factors he would certainly benefit from the use of a full oral anticoagulants if possible  Notably he had significant GI bleeding with apparent end organ damage as a result of taking Coumadin, and thus was transitions to dual antiplatelet medications  I have asked him to followup with his GI doctor to see if it would even be possible to consider full anticoagulation, although I think the prospect is low   -In the interim he should continue his current regimen of medications  I would suggest that he stay active as much as possible  And he should continue followup with his podiatrist in terms of the nonhealing wound on his left foot  Diabetic neuropathy: He does have evidence of length dependent neuropathy in both legs  This is most likely due to diabetes  We discussed the possibility of a medication to treat neuropathic pain  For the time being I would recommend the use of capsaicin cream to the bilateral lower extremities as directed on the package instructions  They should use gloves and applying this medication as much as possible  This should also avoid applying it in any open wounds  Should this prove ineffective or only partially effective I would suggest that gabapentin taken at bedtime could help to relieve the pain and also help Mr Rubio to have a better night sleep  I will plan for Mr Griffin Blanco to return to the office in 4-6 months time but I would be happy to see him sooner if the need should arise   I would like either he or his wife to call my office in 2-3 weeks to let me know how he is doing with the capsaicin cream and also the results of the conversation with his GI doctor    Stroke Patient Family Education [facility]:   Patient/Family was also educated regarding: Stroke Diagnosis (TIA,Ischemic Stroke, ICH, SAH), Personal Stroke/Cardiovascular Risk Factors and Signs And Symptoms Of Stroke/Call 911  Chief Complaint  Chief Complaint Free Text Note Form: Patient presents for neuro consult      History of Present Illness  HPI: 81 y/o RH male with multiple medical problems as below presetns for TIA eval  He presented in 11/2015  That morning he had pain in the back of the neck, He had stiffness in the bilateral hands and he could not open and close them and they were back to normal in a few minutes  He was having needle sharp pains in both feet that morning as well making walking difficult, also only lasted a few minutes  No vision loss, trouble speaking or swallowing  No confusion  Nothing similar has happened before or since, He didn't sleep well the night before due to leg pain  At that time he was also discovered to have a large A-Comm aneurysm as well as bilateral ICA stenosis  He is having dysphagia to solids, appetite is poor, liquids are not a problem  He also notes a hand tremor, bilaterally, typically occurring at rest  No RBD, No change in expression of face or handwriting, +/- shuffling (currently wearing a boot due to a non-healing wound in the foot)  No freezing spells, or stiffness  Review of Systems  Neurological ROS:   Constitutional: recent weight loss, appetite changes and loss of appetite  HEENT: blurred vision, hearing loss and dysphagia  Cardiovascular:  no chest pain or pressure, no palpitations present, the heart rate was not rapid or irregular, no swelling in the arms or legs, no poor circulation  Respiratory: unusual or persistant cough and shortness of breath with or without exertion     Gastrointestinal:  no nausea, no vomiting, no diarrhea, no abdominal pain, no changes in bowel habits, no melena, no loss of bowel control  Genitourinary: increased frequency  Musculoskeletal: pain while walking  Integumentary  no masses, no rash, no skin lesions, no livedo reticularis  Psychiatric: depression and negative SI  Endocrine erection difficulty  Hematologic/Lymphatic: a tendency for easy bruising  Neurological General: trouble falling asleep, waking up at night and Mind racing when he lays down  Neurological Mental Status:  no confusion, no mood swings, no alteration or loss of consciousness, no difficulty expressing/understanding speech, no memory problems  Neurological Cranial Nerves:  no blurry or double vision, no loss of vision, no face drooping, no facial numbness or weakness, no taste or smell loss/changes, no hearing loss or ringing, no vertigo or dizziness, no dysphagia, no slurred speech  Neurological Motor findings include:  no tremor, no twitching, no cramping(pre/post exercise), no atrophy  Neurological Coordination:  no unsteadiness, no vertigo or dizziness, no clumsiness, no problems reaching for objects  Neurological Sensory:  no numbness, no pain, no tingling, does not fall when eyes closed or taking a shower  Neurological Gait:  no difficulty walking, not falling to one side, no sensation of being pushed, has not had falls  ROS Reviewed:   ROS reviewed  Active Problems    1  Acute bronchitis (466 0) (J20 9)   2  Acute Non-Q-wave Myocardial Infarction (410 70)   3  Anemia (285 9) (D64 9)   4  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)   5  Angina pectoris (413 9) (I20 9)   6  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,440 9) (I25 10)   7  Asymptomatic carotid artery narrowing without infarction (433 10) (I65 29)   8  Atrial fibrillation (427 31) (I48 91)   9  Benign essential hypertension (401 1) (I10)   10  Black tarry stools (578 1) (K92 1)   11  CABG   12   Chronic diastolic congestive heart failure (428 32,428 0) (I50 32)   13  Chronic kidney disease (585 9) (N18 9)   14  Chronic obstructive pulmonary disease (496) (J44 9)   15  DMII (diabetes mellitus, type 2) (250 00) (E11 9)   16  Drug eruption (693 0) (L27 0)   17  Dysphagia (787 20) (R13 10)   18  Dyspnea (786 09) (R06 00)   19  Edema (782 3) (R60 9)   20  GERD (gastroesophageal reflux disease) (530 81) (K21 9)   21  Hyperlipidemia (272 4) (E78 5)   22  Hypoglycemia (251 2) (E16 2)   23  Microalbuminuria (791 0) (R80 9)   24  Neuropathy (355 9) (G62 9)   25  Non-healing open wound of right groin (879 5) (S31 103A)   26  Non-proliferative diabetic retinopathy (250 50,362 03) (E11 329)   27  Peripheral vascular disease (443 9) (I73 9)   28  Postoperative state (V45 89) (Z98 89)   29  Prostate disorder (602 9) (N42 9)   30  Puncture Wound Of Foot (892 0)    Past Medical History    1  History of Denial Of Any Significant Medical History   2  History of angina (V12 59) (Z86 79)   3  History of blood transfusion (V15 89) (Z92 89)   4  History of PTCA (V45 82) (Z98 61)   5  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)   6  History of Pulmonary edema (514) (J81 1)  Active Problems And Past Medical History Reviewed: The active problems and past medical history were reviewed and updated today  Surgical History    1  History of CABG   2  CABG   3  History of Cataract Surgery   4  History of Cath Stent Placement   5  History of Hernia Repair   6  History of Surgery For Aneurysm   7  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  Surgical History Reviewed: The surgical history was reviewed and updated today  Family History    1  No pertinent family history    2  Family history of cardiac disorder (V17 49) (Z82 49)    3  Family history of Cancer   4  Family history of Coronary Artery Disease (V17 49)   5  Family history of Diabetes Mellitus (V18 0)  Family History Reviewed: The family history was reviewed and updated today         Social History    · Caffeine use (V49 89) (F15 90)   · Former smoker (V15 82) (O81 784)   · Marital History - Currently    · No drug use   · Stopped Drinking Alcohol  Social History Reviewed: The social history was reviewed and is unchanged  Current Meds   1  Advair Diskus 250-50 MCG/DOSE Inhalation Aerosol Powder Breath Activated; INHALE 1   PUFF TWICE DAILY  RINSE MOUTH AFTER USE; Therapy: 02Rrl2906 to (Evaluate:14Kbj4442) Recorded   2  Albuterol Sulfate (2 5 MG/3ML) 0 083% Inhalation Nebulization Solution; USE 1 UNIT   DOSE VIA NEBULIZER  4 TIMES A DAY AS NEEDED Recorded   3  AmLODIPine Besylate 2 5 MG Oral Tablet; Take 1 tablet daily; Therapy: 82EFI1110 to (Evaluate:27Auk8366)  Requested for: 07Tkk3205; Last   Rx:04Vvg6081 Ordered   4  Aspirin Low Dose 81 MG Oral Tablet; Take 1 tablet daily Recorded   5  Atorvastatin Calcium 40 MG Oral Tablet; TAKE 1 TABLET DAILY AT BEDTIME; Therapy: 16WGY2961 to (Evaluate:05Jan2017)  Requested for: 22ALC6575; Last   Rx:11Jan2016 Ordered   6  Atorvastatin Calcium 40 MG Oral Tablet; Therapy: (Recorded:89Fcx9643) to Recorded   7  Centrum Silver Oral Tablet; Therapy: (Recorded:30Oct2014) to Recorded   8  Cinnamon TABS; take one tablet daily; Therapy: (Recorded:47Bmj2823) to Recorded   9  Clopidogrel Bisulfate 75 MG Oral Tablet; Take 1 tablet daily; Therapy: 95JDO6288 to (Evaluate:79Lcq5061)  Requested for: 58FYB2992; Last   Rx:52Ygn8746 Ordered   10  Daliresp 500 MCG Oral Tablet; TAKE 1 TABLET DAILY; Therapy: (Recorded:23Huz5614) to Recorded   11  FerrouSul 325 (65 Fe) MG Oral Tablet; TAKE 1 TABLET TWICE DAILY WITH MEALS; Therapy: 34JZT2717 to ([place])  Requested for: 84YED6191; Last    Rx:90Xvy1351 Ordered   12  Fish Oil 1200 MG Oral Capsule; Therapy: (Recorded:91Qhy5720) to Recorded   13  Isosorbide Mononitrate ER 60 MG Oral Tablet Extended Release 24 Hour; TAKE 1    TABLET ONCE DAILY; Therapy: (Recorded:48Jqt0400) to Recorded   14  Klor-Con M20 20 MEQ Oral Tablet Extended Release; TAKE 2 TABLETS DAILY     Requested for: 43SUO6904; Last Rx:22Xmf5236 Ordered   15  Lantus SoloStar 100 UNIT/ML Subcutaneous Solution Pen-injector; 35 daily at bedtime; Therapy: 65QTT7678 to (Evaluate:14Mar2016) Recorded   16  Metoprolol Tartrate 50 MG Oral Tablet; take 1 tablet twice a day; Therapy: 01EHU2250 to (Rubén Contreras)  Requested for: 99 108615; Last    Rx:86Jwi9977 Ordered   17  NovoLOG 100 UNIT/ML Subcutaneous Solution; 12 units before breakfast and lunch, 16    before supper; Therapy: (Recorded:76Qqp5580) to Recorded   18  Pantoprazole Sodium 40 MG Oral Tablet Delayed Release; Take 1 tablet twice daily; Therapy: 35OAX4359 to (Last FQ:77FXU9470)  Requested for: 23Oct2015 Ordered   19  Spiriva HandiHaler 18 MCG Inhalation Capsule; USE AS DIRECTED; Therapy: 64Fjc7867 to Recorded   20  Tamsulosin HCl - 0 4 MG Oral Capsule; TAKE ONE CAPSULE BY MOUTH AT BEDTIME; Therapy: (Recorded:67Cri1701) to Recorded   21  Terazosin HCl - 2 MG Oral Capsule; TAKE 2 CAPSULES AT BEDTIME; Therapy: 88DLZ2853 to (Maxi Nina)  Requested for: 76FJH7974; Last    Rx:72Mzq4841 Ordered   22  Torsemide 20 MG Oral Tablet; 20 mg twice daily; Last Rx:09Thp2173 Ordered   23  Vitamin B-12 TABS; 2,000 mcg daily; Therapy: (Recorded:95Ozu5768) to Recorded   24  Vitamin D3 1000 UNIT Oral Tablet; Therapy: (Recorded:71Zhp8993) to Recorded  Medication List Reviewed: The medication list was reviewed and updated today  Allergies    1  No Known Drug Allergies    2  No Known Environmental Allergies   3  No Known Food Allergies    Vitals  Signs [Data Includes: Current Encounter]   Recorded: [id number] 11:35AM   Heart Rate: 68  Respiration: 14  Systolic: 159  Diastolic: 46  Height: 5 ft 10 in  Weight: 195 lb 6 oz  BMI Calculated: 28 03  BSA Calculated: 2 07  O2 Saturation: 93    Physical Exam   Gen: well developed/well nourished, NAD    Head: AT/NC, Eyes: Sclera and conjunctiva clear  Neck: Supple  CV: RRR, No carotid bruits  Resp: CTABL, Abd: Soft, ND  Skin:  no significant lesions  Musculoskeletal: no significant bony abnormalities  Neuro Exam: The patient was found to be awake and alert, with appropriate mental status and language function  He was able to name the months of the year forward was unable to complete naming and backward  Cranial nerves II-XII were intact and symmetric bilaterally  Motor testing reveals 5/5 strength t/o bilateral upper and lower extremities with no drift  Coordination testing was unremarkable  With distraction there was a minimal rest predominant tremor of the left hand with no significant postural reemergence  His gait was non-shuffling with a relatively good donovan  Arm swing was relatively minimal  There was no masked face or bradykinesia on exam   Sensation was diminished to light touch temperature and vibration in length dependent fashion in the bilateral lower extremities to the level of the midshin  DTR's were 2+ and symmetric  Results/Data  Diagnostic Studies Reviewed:   Diagnostic Review     12/13/2015  Lipid Panel:   Triglyceride: 132  Cholesterol: 141  Cholesterol, HDL: 44  LDL, Calculated: 71    A1C: 6 8  Future Appointments    Date/Time Provider Specialty Site   07/19/2016 09:30 AM Trip Nair MD Neurology [facility]   02/16/2016 10:10 AM JASSON Mendes  Endocrinology [facility]   04/14/2016 11:00 AM Cj Rollins DO Internal Medicine  INTERNAL MED   06/16/2016 09:00 AM JASSON Robins   Vascular Surgery [facility]     Signatures   Electronically signed by : Lj Rodrigues MD; Jan 19 2016  1:43PM EST                       (Author)

## 2018-01-10 NOTE — MISCELLANEOUS
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)   8  Angina pectoris (413 9) (I20 9)   9  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,440 9) (I25 10)   10  Asymptomatic carotid artery narrowing without infarction (433 10) (I65 29)   11  Atrial fibrillation (427 31) (I48 91)   12  Benign essential hypertension (401 1) (I10)   13  Bilateral leg edema (782 3) (R60 0)   14  Black tarry stools (578 1) (K92 1)   15  CABG   16  Cellulitis (On Exam)   17  Chest pain (786 50) (R07 9)   18  Chronic combined systolic and diastolic CHF (congestive heart failure) (428 42,428 0)    (I50 42)   19  Chronic diastolic congestive heart failure (428 32,428 0) (I50 32)   20  Chronic kidney disease (585 9) (N18 9)   21  Chronic obstructive pulmonary disease (496) (J44 9)   22  CKD (chronic kidney disease), stage III (585 3) (N18 3)   23  Constipation, chronic (564 00) (K59 09)   24  Depression (311) (F32 9)   25  Diabetic neuropathy, type II diabetes mellitus (250 60,357 2) (E11 40)   26  DMII (diabetes mellitus, type 2) (250 00) (E11 9)   27  Drug eruption (693 0) (L27 0)   28  Dysphagia (787 20) (R13 10)   29  Dyspnea (786 09) (R06 00)   30  Edema (782 3) (R60 9)   31  GERD (gastroesophageal reflux disease) (530 81) (K21 9)   32  History of CVA (cerebrovascular accident) (V12 54) (Z86 73)   35  Hyperkalemia (276 7) (E87 5)   34  Hyperlipidemia (272 4) (E78 5)   35  Hypoglycemia (251 2) (E16 2)   36   Microalbuminuria (791 0) (R80 9)   37  Need for influenza vaccination (V04 81) (Z23)   38  Neuropathy (355 9) (G62 9)   39  Non-healing open wound of right groin (879 5) (S31 103A)   40  Non-proliferative diabetic retinopathy (250 50,362 03) (E11 3299)   41  Peripheral vascular disease (443 9) (I73 9)   42  Postoperative state (V45 89) (Z98 890)   43  Prostate disorder (602 9) (N42 9)   44  Puncture Wound Of Foot (892 0)   45  Sacral decubitus ulcer, stage II (426 15,354 44) (L89 152)   46  Secondary hyperparathyroidism (588 81) (N25 81)   47  Sinusitis (473 9) (J32 9)   48  Skin ulcer, chronic (707 9) (L98 499)   49  Type 2 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 (250 80,707 15) (E11 621,L97 529)   50  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)   51  Vitamin D deficiency (268 9) (E55 9)    Current Meds   1  Advair Diskus 250-50 MCG/DOSE Inhalation Aerosol Powder Breath Activated; INHALE   1 PUFF TWICE DAILY  RINSE MOUTH AFTER USE; Therapy: 48Taj7304 to (Evaluate:72Rff5372) Recorded   2  Albuterol Sulfate (2 5 MG/3ML) 0 083% Inhalation Nebulization Solution; USE 1 UNIT   DOSE VIA NEBULIZER  4 TIMES A DAY AS NEEDED Recorded   3  Aspirin Low Dose 81 MG TABS; Take 1 tablet daily Recorded   4  Atorvastatin Calcium 40 MG Oral Tablet; TAKE 1 TABLET DAILY AT BEDTIME; Therapy: 07LXZ1340 to (Evaluate:05Jan2017)  Requested for: 57GCT5615; Last   Rx:11Jan2016 Ordered   5  BD Pen Needle Mae U/F 32G X 4 MM Miscellaneous; four times daily; Therapy: 88SBT7465 to (Evaluate:13Nov2016)  Requested for: 14Neq7215; Last   Rx:05Fyv9939 Ordered   6  Centrum Silver Oral Tablet; TAKE 1 TABLET DAILY; Therapy: (Recorded:95Pvc6157) to Recorded   7  Cinnamon 500 MG Oral Tablet; 4 tablets daily; Therapy: (Wilhelminia Red) to Recorded   8  Citalopram Hydrobromide 10 MG Oral Tablet; TAKE 1 TABLET AT BEDTIME; Therapy: 10HAH8884 to (Last AP:42BZI2130)  Requested for: 72XAO9350 Ordered   9   Clopidogrel Bisulfate 75 MG Oral Tablet; Take 1 tablet daily; Therapy: 33IMI6802 to (Mary Abrams)  Requested for: 30JPU8777; Last   Rx:42Pqk3335 Ordered   10  Daliresp 500 MCG Oral Tablet; TAKE 1 TABLET DAILY; Therapy: (Recorded:34Zkw1007) to Recorded   11  FerrouSul 325 (65 Fe) MG Oral Tablet; TAKE 1 TABLET DAILY; Therapy: (Nataly Kapoor) to Recorded   12  Fish Oil 1200 MG Oral Capsule; three times daily; Therapy: (Nataly Kapoor) to Recorded   13  Isosorbide Mononitrate ER 30 MG Oral Tablet Extended Release 24 Hour; TAKE 1    TABLET DAILY; Therapy: (Recorded:03Mar2016) to Recorded   14  Lantus SoloStar 100 UNIT/ML Subcutaneous Solution Pen-injector; Inject 35 units at    bedtime as directed by doctor; Therapy: (Recorded:83Mei2533) to Recorded   15  Metoprolol Tartrate 50 MG Oral Tablet; take 1/2 tablet twice daily; Therapy: 36ULY8051 to (Evaluate:06Jun2017); Last Rx:58Rhx3874 Ordered   16  Multi-betic Diabetes Oral Tablet; TAKE 1 TABLET DAILY; Therapy: (Nataly Kapoor) to Recorded   17  NovoLOG FlexPen 100 UNIT/ML Subcutaneous Solution Pen-injector; Inject 12 units @    breakfastk, 12u at lunch and 14u at supper as directed by your    doctor; Therapy: (Recorded:51Fby9641) to Recorded   18  Pantoprazole Sodium 40 MG Oral Tablet Delayed Release; Take 1 tablet twice daily; Therapy: 13DBC0082 to (Evaluate:53Xqj4744)  Requested for: 53Ezh7875; Last    Rx:73Pso5853 Ordered   19  Spiriva HandiHaler 18 MCG Inhalation Capsule; USE AS DIRECTED; Therapy: 88Hla6733 to Recorded   20  Tamsulosin HCl - 0 4 MG Oral Capsule; TAKE ONE CAPSULE BY MOUTH AT BEDTIME; Therapy: (Recorded:21Uqt3300) to Recorded   21  Terazosin HCl - 2 MG Oral Capsule; TAKE 2 CAPSULES AT BEDTIME; Therapy: 13RUV7166 to (Evaluate:56Wnm0465)  Requested for: 35Itz8364; Last    Rx:98Szx1987 Ordered   22   Torsemide 20 MG Oral Tablet; take 2 tablet  in am 1 tab in afternoon  Requested for:    81WSK5857; Last Rx:10Oct2016 Ordered 23  Vitamin B-12 TABS; 2,000 mcg daily; Therapy: (Recorded:10Com0442) to Recorded   24  Vitamin D3 2000 UNIT Oral Capsule; 2 daily equals 4000 iu daily; Therapy: (Recorded:32Wdx3334) to Recorded    Allergies    1  No Known Drug Allergies    2  No Known Environmental Allergies   3   No Known Food Allergies    Signatures   Electronically signed by : Emery Cyr, ; Oct 11 2016 10:40AM EST                       (Author)

## 2018-01-10 NOTE — PROGRESS NOTES
History of Present Illness  Care Coordination Encounter Information:   Type of Encounter: Telephonic   Contact: Initial Contact   Last Office Visit: 4/14/16   Spoke to Patient  Care Coordination [facility] Nurse Denae Bell:   The reason for call is to discuss outreach for follow up/needed services  Outreached patient to see how he is doing since discharge on 4/2/16 for dizziness  Wife answered the phone she is concerned about his breathing today  SHe said he having trouble breathing and walking today  I spoke with the patient he said he is fine  He denied having trouble breathing  He walks with his walker and cane  He said he was outside painting, He said his bottom hurts, [facility] coming twice week to do dressing changes  He said he using his inhaler and albuterol as ordered  I offered an appointment with primary care  He stated he will call the office if he needs anything  Active Problems    1  Accidental fall (E888 9) (W19 XXXA)   2  Acute bronchitis (466 0) (J20 9)   3  Acute Non-Q-wave Myocardial Infarction (410 70)   4  Anemia (285 9) (D64 9)   5  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)   6  Angina pectoris (413 9) (I20 9)   7  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,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 10)   8  Asymptomatic carotid artery narrowing without infarction (433 10) (I65 29)   9  Atrial fibrillation (427 31) (I48 91)   10  Benign essential hypertension (401 1) (I10)   11  Black tarry stools (578 1) (K92 1)   12  CABG   13  Chest pain (786 50) (R07 9)   14  Chronic diastolic congestive heart failure (428 32,428 0) (I50 32)   15  Chronic kidney disease (585 9) (N18 9)   16  Chronic obstructive pulmonary disease (496) (J44 9)   17  Constipation, chronic (564 00) (K59 09)   18  Depression (311) (F32 9)   19  DMII (diabetes mellitus, type 2) (250 00) (E11 9)   20  Drug eruption (693 0) (L27 0)   21  Dysphagia (787 20) (R13 10)   22  Dyspnea (786 09) (R06 00)   23  Edema (782 3) (R60 9)   24   GERD (gastroesophageal reflux disease) (530 81) (K21 9)   25  Hyperkalemia (276 7) (E87 5)   26  Hyperlipidemia (272 4) (E78 5)   27  Hypoglycemia (251 2) (E16 2)   28  Microalbuminuria (791 0) (R80 9)   29  Neuropathy (355 9) (G62 9)   30  Non-healing open wound of right groin (879 5) (S31 103A)   31  Non-proliferative diabetic retinopathy (250 50,362 03) (E11 329)   32  Peripheral vascular disease (443 9) (I73 9)   33  Postoperative state (V45 89) (Z98 89)   34  Prostate disorder (602 9) (N42 9)   35  Puncture Wound Of Foot (892 0)   36  Sacral decubitus ulcer, stage II (416 36,529 37) (L89 152)   37  Sinusitis (473 9) (J32 9)   38  Vitamin D deficiency (268 9) (E55 9)    Past Medical History    1  History of Denial Of Any Significant Medical History   2  History of angina (V12 59) (Z86 79)   3  History of blood transfusion (V15 89) (Z92 89)   4  History of PTCA (V45 82) (Z98 61)   5  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)   6  History of Pulmonary edema (514) (J81 1)    Surgical History    1  History of CABG   2  CABG   3  History of Cataract Surgery   4  History of Cath Stent Placement   5  History of Hernia Repair   6  History of Surgery For Aneurysm   7  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    Family History  Mother    1  No pertinent family history  Spouse    2  Family history of cardiac disorder (V17 49) (Z82 49)  Family History    3  Family history of Cancer   4  Family history of Coronary Artery Disease (V17 49)   5  Family history of Diabetes Mellitus (V18 0)    Social History    · Caffeine use (V49 89) (F15 90)   · Former smoker (I04 02) (D94 466)   · Marital History - Currently    · No drug use   · Stopped Drinking Alcohol    Current Meds    1  Nitrostat 0 4 MG Sublingual Tablet Sublingual; PLACE 1 TABLET UNDER THE TONGUE   EVERY 5 MINUTES FOR UP TO 3 DOSES AS NEEDED FOR CHEST PAIN  CALL   911 IF PAIN PERSISTS;    Therapy: 52Heo0170 to (Sage Gonzalez)  Requested for: 26IRJ9387; Last   Rx:05Apr2016 Ordered    2  Metoprolol Tartrate 50 MG Oral Tablet; take 1/2 tablet by mouth twice a dayl; Therapy: 28Mar2011-(Evaluate:09Apr2017)  Requested for: 14Apr2016 Ordered    3  Clopidogrel Bisulfate 75 MG Oral Tablet; Take 1 tablet daily; Therapy: 94MUM4462 to (Lori Booker)  Requested for: 14BSN2443; Last   Rx:87Gom3928 Ordered    4  AmLODIPine Besylate 2 5 MG Oral Tablet; Take 1 tablet daily; Therapy: 31BAD2202 to (Evaluate:63Ukp8429)  Requested for: 21Uhh7120; Last   Rx:05Jkx2548 Ordered   5  Terazosin HCl - 2 MG Oral Capsule; TAKE 2 CAPSULES AT BEDTIME; Therapy: 24KIP6967 to (Tarun Chne)  Requested for: 84LDG9581; Last   Rx:20Yql0274 Ordered    6  FerrouSul 325 (65 Fe) MG Oral Tablet; TAKE 1 TABLET TWICE DAILY WITH MEALS; Therapy: 08KUA3157 to (Dominick Álvarez)  Requested for: 78KRT1738; Last   Rx:10Avl5887 Ordered    7  Torsemide 20 MG Oral Tablet; Take 1 tablet daily; Therapy: 14Apr2016-(Last:01Dec2015) Ordered    8  Citalopram Hydrobromide 10 MG Oral Tablet; TAKE 1 TABLET AT BEDTIME; Therapy: 40HYW5937 to (Last Rx:51Osx2795)  Requested for: 35Lvd3196 Ordered    9  Lantus SoloStar 100 UNIT/ML Subcutaneous Solution Pen-injector; imject 32 units twice   daily; Therapy: 96WEA7655 to (Dhara Lemons)  Requested for: 08FWE4986; Last   Rx:07Mar2016 Ordered   10  NovoLOG 100 UNIT/ML Subcutaneous Solution; 12 units before breakfast and lunch, 16    before supper; Therapy: (Recorded:89Lup2592) to Recorded   11  NovoLOG FlexPen 100 UNIT/ML Subcutaneous Solution Pen-injector; imject 10 units    before breakfast and lunch and 14 before dinner; Therapy: 99RWY9487 to (Evaluate:05Jun2016)  Requested for: 84FGN1627; Last    Rx:07Mar2016 Ordered    12  Pantoprazole Sodium 40 MG Oral Tablet Delayed Release; Take 1 tablet twice daily; Therapy: 28MHS0808 to (Last BP:99HTK0818)  Requested for: 23Oct2015 Ordered    13   Atorvastatin Calcium 40 MG Oral Tablet; TAKE 1 TABLET DAILY AT BEDTIME; Therapy: 12JDW6568 to (Evaluate:05Jan2017)  Requested for: 44NRM0625; Last    Rx:11Jan2016 Ordered    14  Fluticasone Propionate 50 MCG/ACT Nasal Suspension (Flonase); USE 1 SPRAY IN    EACH NOSTRIL TWICE DAILY; Therapy: 86WMJ6840 to (Last Rx:17Mar2016)  Requested for: 75VVO6326 Ordered    15  Advair Diskus 250-50 MCG/DOSE Inhalation Aerosol Powder Breath Activated; INHALE 1    PUFF TWICE DAILY  RINSE MOUTH AFTER USE; Therapy: 74Tze6957 to (Evaluate:16Feb2014) Recorded   16  Albuterol Sulfate (2 5 MG/3ML) 0 083% Inhalation Nebulization Solution; USE 1 UNIT    DOSE VIA NEBULIZER  4 TIMES A DAY AS NEEDED Recorded   17  Aspirin Low Dose 81 MG Oral Tablet; Take 1 tablet daily Recorded   18  Centrum Silver Oral Tablet; Therapy: (Recorded:30Oct2014) to Recorded   19  Cinnamon TABS; take one tablet daily; Therapy: (Recorded:87Crf6150) to Recorded   20  Daliresp 500 MCG Oral Tablet; TAKE 1 TABLET DAILY; Therapy: (Recorded:79Fux5507) to Recorded   21  Fish Oil 1200 MG Oral Capsule; Therapy: (Recorded:76Zex7835) to Recorded   22  Isosorbide Mononitrate ER 30 MG Oral Tablet Extended Release 24 Hour; TAKE 1    TABLET DAILY; Therapy: (Recorded:03Mar2016) to Recorded   23  Lantus SoloStar 100 UNIT/ML Subcutaneous Solution Pen-injector; 35 daily at bedtime; Therapy: 63BUR2011 to (Evaluate:14Mar2016) Recorded   24  Spiriva HandiHaler 18 MCG Inhalation Capsule; USE AS DIRECTED; Therapy: 47Pbu7555 to Recorded   25  Tamsulosin HCl - 0 4 MG Oral Capsule; TAKE ONE CAPSULE BY MOUTH AT BEDTIME; Therapy: (Recorded:13Mrh3256) to Recorded   26  Vitamin B-12 TABS; 2,000 mcg daily; Therapy: (Recorded:19Jan2016) to Recorded   27  Vitamin D3 1000 UNIT Oral Tablet; Therapy: (Recorded:71Eie6146) to Recorded    Allergies    1  No Known Drug Allergies    2  No Known Environmental Allergies   3  No Known Food Allergies    End of Encounter Meds    1   Nitrostat 0 4 MG Sublingual Tablet Sublingual; PLACE 1 TABLET UNDER THE TONGUE   EVERY 5 MINUTES FOR UP TO 3 DOSES AS NEEDED FOR CHEST PAIN  CALL   911 IF PAIN PERSISTS; Therapy: 45TAX1785 to (Alana Ross)  Requested for: 05Apr2016; Last   Rx:05Apr2016 Ordered    2  Metoprolol Tartrate 50 MG Oral Tablet; take 1/2 tablet by mouth twice a dayl; Therapy: 28Mar2011-(Evaluate:09Apr2017)  Requested for: 14Apr2016 Ordered    3  Clopidogrel Bisulfate 75 MG Oral Tablet; Take 1 tablet daily; Therapy: 20OPX5781 to (Valeria Pall)  Requested for: 42AMK7765; Last   Rx:76Aks7179 Ordered    4  AmLODIPine Besylate 2 5 MG Oral Tablet; Take 1 tablet daily; Therapy: 28DLN2262 to (Evaluate:91Tfz9415)  Requested for: 80Gar9881; Last   Rx:62Bep4252 Ordered   5  Terazosin HCl - 2 MG Oral Capsule; TAKE 2 CAPSULES AT BEDTIME; Therapy: 38YFM1298 to (Ganga Peterson)  Requested for: 76NXT9537; Last   Rx:12Awx3396 Ordered    6  FerrouSul 325 (65 Fe) MG Oral Tablet; TAKE 1 TABLET TWICE DAILY WITH MEALS; Therapy: 69XYE3929 to (Jessica Caban)  Requested for: 86BTF8102; Last   Rx:38Mby3434 Ordered    7  Torsemide 20 MG Oral Tablet; Take 1 tablet daily; Therapy: 14Apr2016-(Last:01Dec2015) Ordered    8  Citalopram Hydrobromide 10 MG Oral Tablet; TAKE 1 TABLET AT BEDTIME; Therapy: 95YDC5096 to (Last Rx:08Chn4233)  Requested for: 23Tvc7815 Ordered    9  Lantus SoloStar 100 UNIT/ML Subcutaneous Solution Pen-injector; imject 32 units twice   daily; Therapy: 01GPO0356 to (Alana Ross)  Requested for: 93SZZ4196; Last   Rx:07Mar2016 Ordered   10  NovoLOG 100 UNIT/ML Subcutaneous Solution; 12 units before breakfast and lunch, 16    before supper; Therapy: (Recorded:39Rzf8934) to Recorded   11  NovoLOG FlexPen 100 UNIT/ML Subcutaneous Solution Pen-injector; imject 10 units    before breakfast and lunch and 14 before dinner; Therapy: 87NTC8399 to (Evaluate:05Jun2016)  Requested for: 58KOI8424; Last    Rx:07Mar2016 Ordered    12  Pantoprazole Sodium 40 MG Oral Tablet Delayed Release; Take 1 tablet twice daily; Therapy: 39JWC1565 to (Last BR:55XDZ6433)  Requested for: 23Oct2015 Ordered    13  Atorvastatin Calcium 40 MG Oral Tablet; TAKE 1 TABLET DAILY AT BEDTIME; Therapy: 83WDC9875 to (Evaluate:05Jan2017)  Requested for: 37NCF2104; Last    Rx:11Jan2016 Ordered    14  Fluticasone Propionate 50 MCG/ACT Nasal Suspension (Flonase); USE 1 SPRAY IN    EACH NOSTRIL TWICE DAILY; Therapy: 29OZE1245 to (Last Rx:17Mar2016)  Requested for: 30IIR2312 Ordered    15  Advair Diskus 250-50 MCG/DOSE Inhalation Aerosol Powder Breath Activated; INHALE 1    PUFF TWICE DAILY  RINSE MOUTH AFTER USE; Therapy: 97Ydy8924 to (Evaluate:16Feb2014) Recorded   16  Albuterol Sulfate (2 5 MG/3ML) 0 083% Inhalation Nebulization Solution; USE 1 UNIT    DOSE VIA NEBULIZER  4 TIMES A DAY AS NEEDED Recorded   17  Aspirin Low Dose 81 MG Oral Tablet; Take 1 tablet daily Recorded   18  Centrum Silver Oral Tablet; Therapy: (Recorded:30Oct2014) to Recorded   19  Cinnamon TABS; take one tablet daily; Therapy: (Recorded:37Zow7136) to Recorded   20  Daliresp 500 MCG Oral Tablet; TAKE 1 TABLET DAILY; Therapy: (Recorded:95Iiu1874) to Recorded   21  Fish Oil 1200 MG Oral Capsule; Therapy: (Recorded:42Eun7060) to Recorded   22  Isosorbide Mononitrate ER 30 MG Oral Tablet Extended Release 24 Hour; TAKE 1    TABLET DAILY; Therapy: (Recorded:03Mar2016) to Recorded   23  Lantus SoloStar 100 UNIT/ML Subcutaneous Solution Pen-injector; 35 daily at bedtime; Therapy: 72VVV5515 to (Evaluate:14Mar2016) Recorded   24  Spiriva HandiHaler 18 MCG Inhalation Capsule; USE AS DIRECTED; Therapy: 52Jrc3918 to Recorded   25  Tamsulosin HCl - 0 4 MG Oral Capsule; TAKE ONE CAPSULE BY MOUTH AT BEDTIME; Therapy: (Recorded:18Hzg4586) to Recorded   26  Vitamin B-12 TABS; 2,000 mcg daily; Therapy: (Recorded:19Jan2016) to Recorded   27   Vitamin D3 1000 UNIT Oral Tablet; Therapy: (Pat Watson) to Recorded    Future Appointments    Date/Time Provider Specialty Site   07/19/2016 09:30 AM Judy Martin MD Neurology [address]   06/14/2016 10:00 AM Jonathan Pappas DO Internal Medicine [facility] INTERNAL MED   06/16/2016 09:00 AM JASSON Zhang  Vascular Surgery [facility] NEUROSURGICAL   04/21/2016 01:15 PM Maxime Arredondo Endocrinology [address]     Patient Care Team    Care Team Member Role Specialty Office Number   Shon Carreno MD  Urology [phone]   Hortencia Catalan MD  Endocrinology [phone]   L  V  [facility]  Vascular Surgery [phone]   Roya KRAFT  Cardiology [phone]   Azucena Ding MD  Pulmonary Medicine [phone]   Jayna Pompa MD  Neurosurgery [phone]   Mahi Elkins DPM   [phone]   Christine KRAFT   Specialist Endocrinology [phone] [address] [facility] Specialist Neurosurgery [phone]   Bin Juan MD  Vascular Surgery [phone]     Signatures   Electronically signed by : Mare Ascencio RN; Apr 20 2016 10:59AM EST                       (Author)

## 2018-01-11 NOTE — PROGRESS NOTES
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 9) (I25 10)   2  Atrial fibrillation (427 31) (I48 91)   3  Benign essential hypertension (401 1) (I10)   4  Chronic diastolic congestive heart failure (428 32,428 0) (I50 32)   5  Dyspnea (786 09) (R06 00)   6  Hyperlipidemia (272 4) (E78 5)    Plan  Hyperlipidemia    · Follow-up visit in 3 months Evaluation and Treatment  Follow-up  Status: Complete   Done: 33AHA5942   Ordered; For: Hyperlipidemia; Ordered By: Maikel Schneider Performed:  Due: 57Vfp1766; Last Updated By: Mela Sorto; 8/16/2016 11:56:46 AM    Discussion/Summary  Cardiology Discussion Summary Free Text Note Form [facility]:   Chronic congestive heart failure diastolic  Multiple hospitalizations  Biventricular  Left ventricular systolic function is normal, he does have mild much insufficiency, mild to moderate tricuspid insufficiency with mild to moderate pulmonary hypertension in the setting of diastolic dysfunction and ongoing COPD  Continue higher dose diuretic, no renal dysfunction on a high-dose  Cardiac catheterization last year revealed the LIMA to the LAD to be patent, saphenous vein graft to the PDA was patent  Saphenous vein graft sequential to the first diagonal and first marginal was patent  Previous the stent of the left main into the circumflex was patent as well  Chronic atrial fibrillation on rate control plus dual antiplatelets therapy  Previously intolerant to anticoagulation because of GI bleeding  Patient is still reluctant to take full anticoagulation  He understands is a slightly high risk of CVA at this current medical regimen  He does have concomitant carotid disease with 70-80% on the right by CT 50-69% by duplex  Continue dual antiplatelets therapy and statin therapy  His LDL was 68 this year  Chief Complaint  Chief Complaint Free Text Note Form: Pt  here for hospital follow up after recent admission for CHF  Pt  has no cardiac complaints  History of Present Illness  Cardiology HPI Free Text Note Form [facility]: Follow-up after recent hospitalization for acute exacerbation of congestive heart failure  The patient admits to not dietary indiscretions  His been take his medication is being compliant with his fluid restrictions and sodium restriction  Despite that he continues to have multiple admission for congestive failure, luckily his admission have been shorter with rapid response to intravenous diuretic  He does have concomitant COPD and is an element of pulmonary disease in his admissions as well  He currently denies any chest pain, he ambulates at slow pace  He has no orthopnea or PND  Overall feeling well currently  Review of Systems  Cardiology Male ROS:     Cardiac: rhythm problems and AM fatigue, but as noted in HPI, no chest pain, no fainting/blackouts, no heart murmur present, no signs of swelling, no palpitations present, no syncope/fainting and no witnessed apnea episodes  Skin: No complaints of nonhealing sores or skin rash  and no non healing sores present   Genitourinary: kidney problems, but no blood in urine Chronic kidney disease, creatinine 1 4, GFR in the 40s  Psychological: No complaints of feeling depressed, anxiety, panic attacks, or difficulty concentrating , no depression and no anxiety  General: lack of energy/fatigue, but no trouble sleeping, no appetite changes, no changes in weight and no fever  Respiratory: shortness of breath, but no cough/sputum, no wheezing, no phlegm and no hemoptysis  HEENT: No complaints of serious problems, hearing problems, nose problems, throat problems, or snoring  , no serious eye problems and no hearing problems   Gastrointestinal: No complaints of liver problems, nausea, vomiting, heartburn, constipation, bloody stools, diarrhea, problems swallowing, adbominal pain, or rectal bleeding  , no liver problems, no bloody stools, no abdonimal pain and no rectal bleeding   Hematologic: excessive bruising, but no bleeding disorders and no anemia   Neurological: no tingling, no weakness, no dizziness and no daytime sleepiness   Musculoskeletal: arthritis No myalgias  Active Problems  Problems    1  Abnormal weight gain (783 1) (R63 5)   2  Accidental fall (E888 9) (W19 XXXA)   3  Acute bronchitis (466 0) (J20 9)   4  Acute Non-Q-wave Myocardial Infarction (410 70)   5  Ambulatory dysfunction (719 7) (R26 2)   6  Anemia (285 9) (D64 9)   7  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)   8  Angina pectoris (413 9) (I20 9)   9  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,440 9) (I25 10)   10  Asymptomatic carotid artery narrowing without infarction (433 10) (I65 29)   11  Atrial fibrillation (427 31) (I48 91)   12  Benign essential hypertension (401 1) (I10)   13  Bilateral leg edema (782 3) (R60 0)   14  Black tarry stools (578 1) (K92 1)   15  CABG   16  Cellulitis (On Exam)   17  Chest pain (786 50) (R07 9)   18  Chronic diastolic congestive heart failure (428 32,428 0) (I50 32)   19  Chronic kidney disease (585 9) (N18 9)   20  Chronic obstructive pulmonary disease (496) (J44 9)   21  CKD (chronic kidney disease), stage III (585 3) (N18 3)   22  Constipation, chronic (564 00) (K59 09)   23  Depression (311) (F32 9)   24  Diabetic neuropathy, type II diabetes mellitus (250 60,357 2) (E11 40)   25  DMII (diabetes mellitus, type 2) (250 00) (E11 9)   26  Drug eruption (693 0) (L27 0)   27  Dysphagia (787 20) (R13 10)   28  Dyspnea (786 09) (R06 00)   29  Edema (782 3) (R60 9)   30  GERD (gastroesophageal reflux disease) (530 81) (K21 9)   31  History of CVA (cerebrovascular accident) (V12 54) (Z86 73)   32  Hyperkalemia (276 7) (E87 5)   33  Hyperlipidemia (272 4) (E78 5)   34  Hypoglycemia (251 2) (E16 2)   35  Microalbuminuria (791 0) (R80 9)   36  Neuropathy (355 9) (G62 9)   37  Non-healing open wound of right groin (879 5) (S31 103A)   38   Non-proliferative diabetic retinopathy (250 50,362 03) (E11 329)   39  Peripheral vascular disease (443 9) (I73 9)   40  Postoperative state (V45 89) (Z98 89)   41  Prostate disorder (602 9) (N42 9)   42  Puncture Wound Of Foot (892 0)   43  Sacral decubitus ulcer, stage II (689 49,870 02) (L89 152)   44  Secondary hyperparathyroidism (588 81) (N25 81)   45  Sinusitis (473 9) (J32 9)   46  Skin ulcer, chronic (707 9) (L98 499)   47  Type 2 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 (250 80,707 15) (E11 621,L97 529)   48  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)   49  Vitamin D deficiency (268 9) (E55 9)    Past Medical History  Problems    1  History of Denial Of Any Significant Medical History   2  History of angina (V12 59) (Z86 79)   3  History of blood transfusion (V15 89) (Z92 89)   4  History of PTCA (V45 82) (Z98 61)   5  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)   6  History of Pulmonary edema (514) (J81 1)  Active Problems And Past Medical History Reviewed: The active problems and past medical history were reviewed and updated today  Surgical History  Problems    1  History of CABG   2  CABG   3  History of Cataract Surgery   4  History of Cath Stent Placement   5  History of Hernia Repair   6  History of Surgery For Aneurysm   7  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  Surgical History Reviewed: The surgical history was reviewed and updated today  Family History  Mother    1  Family history of Diabetes Mellitus (V18 0)  Sister    2  Family history of malignant neoplasm (V16 9) (Z80 9)  Spouse    3  Family history of cardiac disorder (V17 49) (Z82 49)  Family History    4  Family history of Cancer   5  Family history of Coronary Artery Disease (V17 49)   6  Family history of Diabetes Mellitus (V18 0)  Family History Reviewed: The family history was reviewed and updated today         Social History  Problems    · Denied: History of Always uses seat belt   · Caffeine use (V49 89) (F15 90)   · Daily caffeine consumption, 1 serving a day   · Does not exercise (V69 0) (Z72 3)   · Former smoker (S42 13) (X80 301)   · Marital History - Currently    · No drug use   · Stopped Drinking Alcohol  Social History Reviewed: The social history was reviewed and is unchanged  Current Meds   1  Advair Diskus 250-50 MCG/DOSE Inhalation Aerosol Powder Breath Activated; INHALE 1   PUFF TWICE DAILY  RINSE MOUTH AFTER USE; Therapy: 72Mgu3980 to (Evaluate:72Tyr1745) Recorded   2  Albuterol Sulfate (2 5 MG/3ML) 0 083% Inhalation Nebulization Solution; USE 1 UNIT   DOSE VIA NEBULIZER  4 TIMES A DAY AS NEEDED Recorded   3  AmLODIPine Besylate 2 5 MG Oral Tablet; Take 1 tablet daily; Therapy: 79EGM9256 to (Evaluate:67Pmm2332)  Requested for: 55Edw9556; Last   Rx:68Xcc7674 Ordered   4  Aspirin Low Dose 81 MG TABS; Take 1 tablet daily Recorded   5  Atorvastatin Calcium 40 MG Oral Tablet; TAKE 1 TABLET DAILY AT BEDTIME; Therapy: 07SZA7998 to (Evaluate:05Jan2017)  Requested for: 40WSH2153; Last   Rx:11Jan2016 Ordered   6  BD Pen Needle Mae U/F 32G X 4 MM Miscellaneous; four times daily; Therapy: 34OCP6979 to (Evaluate:13Nov2016)  Requested for: 76Ooc1681; Last   Rx:28Alx4845 Ordered   7  Centrum Silver Oral Tablet; TAKE 1 TABLET DAILY; Therapy: (Recorded:84Jxz8154) to Recorded   8  Cinnamon TABS; take one tablet daily; Therapy: (Recorded:40Maw8846) to Recorded   9  Citalopram Hydrobromide 10 MG Oral Tablet; TAKE 1 TABLET AT BEDTIME; Therapy: 29NKN7810 to (Last FP:73GNS1866)  Requested for: 88UOV2294 Ordered   10  Clopidogrel Bisulfate 75 MG Oral Tablet; Take 1 tablet daily; Therapy: 59CUO6003 to (67 488 45 07)  Requested for: 20OYS5643; Last    Rx:07Dnr1742 Ordered   11  Daliresp 500 MCG Oral Tablet; TAKE 1 TABLET DAILY; Therapy: (Recorded:47Wnv4597) to Recorded   12  FerrouSul 325 (65 Fe) MG Oral Tablet; TAKE 1 TABLET TWICE DAILY WITH MEALS;     Therapy: 52KCE9041 to (Timmy Ornelas)  Requested for: 68UZK2644; Last    Rx:41Lmx0158 Ordered   13  Fish Oil 1200 MG Oral Capsule; Therapy: (Recorded:12Dwi4457) to Recorded   14  Isosorbide Mononitrate ER 30 MG Oral Tablet Extended Release 24 Hour; TAKE 1    TABLET DAILY; Therapy: (Recorded:03Mar2016) to Recorded   15  Lantus SoloStar 100 UNIT/ML Subcutaneous Solution Pen-injector; Inject 35 units at    bedtime as directed by doctor; Therapy: (Recorded:52Ygn8055) to Recorded   16  Metoprolol Tartrate 50 MG Oral Tablet; take 1/2 tablet by mouth twice a dayl; Therapy: 28Mar2011-(Evaluate:09Apr2017)  Requested for: 14Apr2016 Ordered   17  Multi-betic Diabetes Oral Tablet; Therapy: (Recorded:74Zrv2836) to Recorded   18  Nitrostat 0 4 MG Sublingual Tablet Sublingual; PLACE 1 TABLET UNDER THE TONGUE    EVERY 5 MINUTES FOR UP TO 3 DOSES AS NEEDED FOR CHEST PAIN  CALL    911 IF PAIN PERSISTS; Therapy: 91PCE8087 to (Kathy Merino)  Requested for: 05Apr2016; Last    Rx:05Apr2016 Ordered   19  NovoLOG FlexPen 100 UNIT/ML Subcutaneous Solution Pen-injector; imject 14 units    before breakfast and lunch and 14 before dinner; Therapy: 17QUO2527 to (Kwabena Lundy)  Requested for: 28XIU5893; Last    Rx:24May2016 Ordered   20  NovoLOG FlexPen 100 UNIT/ML Subcutaneous Solution Pen-injector; Inject 12 units @    breakfastk, 12u at lunch and 14u at supper as directed by your    doctor; Therapy: (Recorded:10Isf3197) to Recorded   21  Pantoprazole Sodium 40 MG Oral Tablet Delayed Release; Take 1 tablet twice daily; Therapy: 39YSO9121 to (Evaluate:81Qtg4952)  Requested for: 15Sav1043; Last    Rx:12Jul2016 Ordered   22  Spiriva HandiHaler 18 MCG Inhalation Capsule; USE AS DIRECTED; Therapy: 67Ddh5877 to Recorded   23  Tamsulosin HCl - 0 4 MG Oral Capsule; TAKE ONE CAPSULE BY MOUTH AT BEDTIME; Therapy: (Recorded:23Zel6270) to Recorded   24  Terazosin HCl - 2 MG Oral Capsule; TAKE 2 CAPSULES AT BEDTIME;     Therapy: 42WHM5735 to (Raymundo Albrecht)  Requested for: 67WAM6121; Last    Rx:35Lca4975 Ordered   25  Torsemide 20 MG Oral Tablet; Take 1 tablet twice daily; Therapy: (Recorded:55Vak4974) to  Requested for: 16Voz7486 Recorded   26  Vitamin B-12 TABS; 2,000 mcg daily; Therapy: (Recorded:48Ecs5460) to Recorded   27  Vitamin D3 1000 UNIT Oral Tablet; TAKE 4 TABLET Daily; Last GM:56YIV9902 Ordered  Medication List Reviewed: The medication list was reviewed and updated today  Allergies  Medication    1  No Known Drug Allergies  Non-Medication    2  No Known Environmental Allergies   3  No Known Food Allergies    Vitals  Vital Signs    Recorded: 49XMD9045 82:37KK   Systolic 971, RUE, Sitting   Diastolic 62, RUE, Sitting   Heart Rate 66, Apical   Height 5 ft 9 in   Weight 183 lb 5 oz   BMI Calculated 27 07   BSA Calculated 1 99     Physical Exam    Constitutional   General appearance: Abnormal   chronically ill, overweight, rested and well hydrated  Neck   Neck and thyroid: Normal, supple, trachea midline, no thyromegaly  the trachea was midline  Jugular Veins: the JVP was not elevated  Pulmonary   Respiratory effort: No increased work of breathing or signs of respiratory distress  Respiratory rate: normal  Assessment of respiratory effort revealed normal rhythm and effort, no accessory muscle use and no supraclavicular retractions  Respiratory Findings: no audible wheezing and no stridor  Evaluation of respiratory movements showed no abdominal breathing  Auscultation of lungs: Abnormal   Auscultation of the lungs revealed decreased breath sounds diffusely and prolonged expiratory time  no rales or crackles were heard bilaterally  Cardiovascular   Palpation of heart: Normal PMI, no thrills  Auscultation of heart: Abnormal   The heart rate was normal  The rhythm was irregularly irregular  Heart sounds: normal S1, normal S2, no gallop heard and the heart sounds were not distant  No pericardial rub   no murmurs were heard  Carotid pulses: Abnormal   right 2+, right carotid bruit heard, left 2+ and no bruit heard over the left carotid  Pedal pulses: Abnormal   Posterior tibialis pulse was 1+ on the right and 1+ on the left  Dorsalis pedis pulse was 1+ on the right and 1+ on the left  Examination of extremities for edema and/or varicosities: Abnormal   no edema  varicosities noted bilaterally  Chest - Chest: Normal    Abdomen   Abdomen: Non-tender and no distention  Musculoskeletal Digits and nails: Normal without clubbing or cyanosis  Examination of the extremities revealed no fingernail clubbing and well perfused fingers  Skin - Skin and subcutaneous tissue: Normal without rashes or lesions  Skin is warm and well perfused, normal turgor  Skin Inspection: normal color and pigmentation, no cyanosis, no diaphoresis, no erythema and no pallor     Neurologic - Speech: Normal     Psychiatric - Orientation to person, place, and time: Normal  Mood and affect: Normal       Future Appointments    Date/Time Provider Specialty Site   01/27/2017 09:30 AM Cinthia Oliver MD Neurology [address]   09/01/2016 09:30 AM Jeannette Hi DO Internal Medicine [facility] INTERNAL MED   08/22/2016 09:45 AM George Sun Endocrinology [facility] ENDOCRINOLOGY [address] [place] [address]     Signatures   Electronically signed by : Jenean Osler, M D ; Aug 16 2016 12:03PM EST                       (Author)

## 2018-01-11 NOTE — PROGRESS NOTES
Assessment    1  Type 2 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 (250 80,707 15) (E11 621,L97 529)   2  DMII (diabetes mellitus, type 2) (250 00) (E11 9)    Plan  DMII (diabetes mellitus, type 2)    · Follow-up PRN Evaluation and Treatment  Follow-up  Status: Complete  Done:  16VOE6534   Ordered; For: DMII (diabetes mellitus, type 2); Ordered By: Cherylene Cleaver Performed:  Due: 24KDP8062  Type 2 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    · Orthopedic Footwear Wedges Metatarsal Rocker; Status:Complete;   Done: 89SHI2109   Perform:Not Applicable; FAF:68GEF7650;YMAOWBQ; For:Type 2 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; Ordered By:Tim Syed; Wound Care Orders/Instructions    Wound Identification and Instructions   Wound #2: left great plantar toe    Wound Care Instructions  Discussed with Patient/Caregiver  Wound Goals  Wound Goals:   Healing Goals:   Fair healing potential, expect slow healing progress secondary to co-morbid conditions and advanced age  Wound edges will appear with evidence of contraction and epithelialization   Patient will achieve full wound closure with ability to wear appropriate shoewear       Chief Complaint  Follow up for left great plantar toe wound  History of Present Illness    Wound Identification HPI   Wound #2: left great plantar toe          The patient came to Wound Care and was ambulatory with device, cane  The patient is being seen for a follow-up with MD at the [address] [place] [address]  The patient is accompanied by his spouse  The patient's identification was verified  A secondary verification process was completed  Orientation: oriented to person, oriented to place and oriented to time  Blood Glucose:  180 mg/dL as reported by patient  6/9/2016: Patient was referred by Dr Alona Pfeiffer PCP  Patient reports he was walking barefoot in his garage and stepped on something   [facility] was given a Tetanus shot and given antibiotic  Patient was instructed to follow up in 4 days later with Dr Laverne Linares  Patient reported toe got infected and they found metal in the toes  Patient reports his podiatrist is now Dr Rachana Martin  Current wound care Neosporin  Patient has a raised area to medial lower extremity  Patient has [facility] currently in the home  6/16/16: Patient arrived with no dressing  Patient reported he was admitted to [facility] on 6/10/2016 for CHF and discharged home 6/13/2016  X ray done and Dr Maxine Davison aware of results  Patient has a small pressure areas to left buttock  Spouse is using Calazime to the area  Spouse will continue to use until area is healed  Area measures 0 1x0 5x0cm  Patient appears healed  Patient is discharged from [facility] and will refer all problems to PCP  Patient Dr Maxine Davison is going to give a script for a metatarsal bar  History of Falling: Yes = 25   Secondary Diagnosis: Yes = 15   Ambulatory Aid Crutches, Misbah Sergo, Walker = 15   No = 0   Gait: Normal = 0   Mental Status: Oriented to own ability = 0   Total Score: 30    25 - 45 = Moderate Risk        The most recent fall occurred 6/8/2016 and tripped at home  The fall resulted from loss of balance  The fall was preceded by no known event  The fall resulted in scratch to right forearm  A referral was made for denies going to emergency department  Nutrition Assessment Screening: Food intake over the last 3 months due to the loss of appetite, digestive problems, chewing or swallowing difficulties is graded as: 1 = moderate decrease in food intake   Weight loss during the last 3 months: 1 = does not know   Mobility scored as: 2 = goes out  Psychological Stress and Acute Disease Scored as: 2 = The patient has not experienced psychological stress or acute disease in the last 3 months  Neuropsychological problems scored as: 2 = no psychological problems  Body Mass Index (BMI) scored as: 3 = BMI 23 or greater     Nutritional Assessment Screening Score: 8 - 11 points - At risk of malnutrition  Provider Wound Care HPI: Mat Hannon is an 17-year-old male who presents to the wound management Center at [facility] for follow-up evaluation 6-16-16  Patient was hospitalized since last visit for shortness of breath at [facility]  Patient was in-house wound healed  Patient presents today for follow-up evaluation  Patient presents in a surgical shoe today  He has his diabetic shoes with him, which were obtained from [employer]  Pain Assessment   the patient states they do not have pain  (on a scale of 0 to 10, the patient rates the pain at 0 )   Abuse And Domestic Violence Screen   Domestic violence screen not done today  Reason DV Screen not done: spouse present    Depression And Suicide Screen  Suicide screen not done today  Reason suicide screen not done: spouse present  Prefered Language is  [place]  Primary Language is  English  Readiness To Learn: Receptive  Barriers To Learning: none  Preferred Learning: demonstration and written   Education Completed: disease/condition, medications, further treatment/follow-up and treatment/procedure   Teaching Method: verbal   Person Taught: patient and spouse   Evaluation Of Learning: verbalized/demonstrated understanding and needs reinforcement      Review of Systems    Constitutional: recent ~Ulb weight gain, but no fever or chills, feels well, no tiredness, no recent weight loss or weight gain  Cardiovascular: palpitations and Afib  Respiratory: no complaints of shortness of breath, no wheezing or cough, no dyspnea on exertion, no orthopnea or PND  Genitourinary: no complaints of dysuria or incontinence, no hesitancy, no nocturia, no genital lesion, no inadequacy of penile erection  Musculoskeletal: limb pain and limb swelling  Integumentary: skin wound and left hallux  Neurological: diabetic neuropathy  ROS reviewed  Active Problems    1   Abnormal weight gain (783  1) (R63 5)   2  Accidental fall (E888 9) (W19 XXXA)   3  Acute bronchitis (466 0) (J20 9)   4  Acute Non-Q-wave Myocardial Infarction (410 70)   5  Ambulatory dysfunction (719 7) (R26 2)   6  Anemia (285 9) (D64 9)   7  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)   8  Angina pectoris (413 9) (I20 9)   9  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,440 9) (I25 10)   10  Asymptomatic carotid artery narrowing without infarction (433 10) (I65 29)   11  Atrial fibrillation (427 31) (I48 91)   12  Benign essential hypertension (401 1) (I10)   13  Bilateral leg edema (782 3) (R60 0)   14  Black tarry stools (578 1) (K92 1)   15  CABG   16  Cellulitis (On Exam)   17  Chest pain (786 50) (R07 9)   18  Chronic diastolic congestive heart failure (428 32,428 0) (I50 32)   19  Chronic kidney disease (585 9) (N18 9)   20  Chronic obstructive pulmonary disease (496) (J44 9)   21  Constipation, chronic (564 00) (K59 09)   22  Depression (311) (F32 9)   23  Diabetic neuropathy, type II diabetes mellitus (250 60,357 2) (E11 40)   24  DMII (diabetes mellitus, type 2) (250 00) (E11 9)   25  Drug eruption (693 0) (L27 0)   26  Dysphagia (787 20) (R13 10)   27  Dyspnea (786 09) (R06 00)   28  Edema (782 3) (R60 9)   29  GERD (gastroesophageal reflux disease) (530 81) (K21 9)   30  Hyperkalemia (276 7) (E87 5)   31  Hyperlipidemia (272 4) (E78 5)   32  Hypoglycemia (251 2) (E16 2)   33  Microalbuminuria (791 0) (R80 9)   34  Neuropathy (355 9) (G62 9)   35  Non-healing open wound of right groin (879 5) (S31 103A)   36  Non-proliferative diabetic retinopathy (250 50,362 03) (E11 329)   37  Peripheral vascular disease (443 9) (I73 9)   38  Postoperative state (V45 89) (Z98 89)   39  Prostate disorder (602 9) (N42 9)   40  Puncture Wound Of Foot (892 0)   41  Sacral decubitus ulcer, stage II (607 84,327 05) (L89 152)   42  Secondary hyperparathyroidism (588 81) (N25 81)   43  Sinusitis (473 9) (J32 9)   44   Skin ulcer, chronic (707 9) (L98 499)   45  Type 2 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 (250 80,707 15) (E11 621,L97 529)   46  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)   47  Vitamin D deficiency (268 9) (E55 9)    Past Medical History    1  History of Denial Of Any Significant Medical History   2  History of angina (V12 59) (Z86 79)   3  History of blood transfusion (V15 89) (Z92 89)   4  History of PTCA (V45 82) (Z98 61)   5  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)   6  History of Pulmonary edema (514) (J81 1)    The active problems and past medical history were reviewed and updated today  Surgical History    1  History of CABG   2  CABG   3  History of Cataract Surgery   4  History of Cath Stent Placement   5  History of Hernia Repair   6  History of Surgery For Aneurysm   7  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    The surgical history was reviewed and updated today  Family History    1  No pertinent family history    2  Family history of cardiac disorder (V17 49) (Z82 49)    3  Family history of Cancer   4  Family history of Coronary Artery Disease (V17 49)   5  Family history of Diabetes Mellitus (V18 0)    The family history was reviewed and updated today  Social History    · Caffeine use (V49 89) (F15 90)   · Former smoker (V15 82) (G03 853)   · Marital History - Currently    · No drug use   · Stopped Drinking Alcohol  The social history was reviewed and updated today  Current Meds   1  Advair Diskus 250-50 MCG/DOSE Inhalation Aerosol Powder Breath Activated; INHALE 1   PUFF TWICE DAILY  RINSE MOUTH AFTER USE; Therapy: 41Cwj0999 to (Evaluate:63Tza4488) Recorded   2  Albuterol Sulfate (2 5 MG/3ML) 0 083% Inhalation Nebulization Solution; USE 1 UNIT   DOSE VIA NEBULIZER  4 TIMES A DAY AS NEEDED Recorded   3  AmLODIPine Besylate 2 5 MG Oral Tablet; Take 1 tablet daily;    Therapy: 12QQH8118 to (Evaluate:95Jve6373)  Requested for: 46BCG2917; Last   Rx:17Pqx2891 Ordered   4  Aspirin Low Dose 81 MG TABS; Take 1 tablet daily Recorded   5  Atorvastatin Calcium 40 MG Oral Tablet; TAKE 1 TABLET DAILY AT BEDTIME; Therapy: 72OSU4074 to (Evaluate:05Jan2017)  Requested for: 78VJH8066; Last   Rx:11Jan2016 Ordered   6  Centrum Silver Oral Tablet; Therapy: (Recorded:30Oct2014) to Recorded   7  Cinnamon TABS; take one tablet daily; Therapy: (Recorded:31Wyg3713) to Recorded   8  Citalopram Hydrobromide 10 MG Oral Tablet; TAKE 1 TABLET AT BEDTIME; Therapy: 92TOF1159 to (Last IQ:41BGU7189)  Requested for: 83AHN8260 Ordered   9  Citalopram Hydrobromide 10 MG Oral Tablet; TAKE 1 TABLET AT BEDTIME; Therapy: 96DXX3895 to (Last Rx:74Gwu8673)  Requested for: 12WVI9889 Ordered   10  Clopidogrel Bisulfate 75 MG Oral Tablet; Take 1 tablet daily; Therapy: 10GCT9435 to (Cecilia Minor)  Requested for: 92YKI9391; Last    Rx:85Nqp7709 Ordered   11  Daliresp 500 MCG Oral Tablet; TAKE 1 TABLET DAILY; Therapy: (Recorded:34Kdm0339) to Recorded   12  FerrouSul 325 (65 Fe) MG Oral Tablet; TAKE 1 TABLET TWICE DAILY WITH MEALS; Therapy: 90SGK2864 to (Haily Jimenez)  Requested for: 76WRE7416; Last    Rx:41Eym1803 Ordered   13  Fish Oil 1200 MG Oral Capsule; Therapy: (Recorded:45Jim8512) to Recorded   14  Isosorbide Mononitrate ER 30 MG Oral Tablet Extended Release 24 Hour; TAKE 1    TABLET DAILY; Therapy: (Recorded:03Mar2016) to Recorded   15  Lantus SoloStar 100 UNIT/ML Subcutaneous Solution Pen-injector; Inject 35 units at    bedtime as directed by doctor; Therapy: (Recorded:16Dgr8250) to Recorded   16  Lidocaine HCl (Local Anesth ) 4 % SOLN; Apply prn to wound(s) prior to debridment for    pain control; Therapy: (Recorded:09Jun2016) to Recorded   17  Metoprolol Tartrate 50 MG Oral Tablet; take 1/2 tablet by mouth twice a dayl; Therapy: 28Mar2011-(Evaluate:09Apr2017)  Requested for: 14Apr2016 Ordered   18   Multi-betic Diabetes Oral Tablet; Therapy: (Recorded:40Qwr0073) to Recorded   19  Nitrostat 0 4 MG Sublingual Tablet Sublingual; PLACE 1 TABLET UNDER THE TONGUE    EVERY 5 MINUTES FOR UP TO 3 DOSES AS NEEDED FOR CHEST PAIN  CALL    911 IF PAIN PERSISTS; Therapy: 10VUN8229 to (Tete Heaton)  Requested for: 05Apr2016; Last    Rx:05Apr2016 Ordered   20  NovoLOG FlexPen 100 UNIT/ML Subcutaneous Solution Pen-injector; imject 14 units    before breakfast and lunch and 14 before dinner; Therapy: 73WZM7734 to (Hattie Lujan)  Requested for: 03JNU8523; Last    Rx:24May2016 Ordered   21  NovoLOG FlexPen 100 UNIT/ML Subcutaneous Solution Pen-injector; Inject 12 units @    breakfastk, 12u at lunch and 14u at supper as directed by your    doctor; Therapy: (Recorded:66Qby2112) to Recorded   22  Pantoprazole Sodium 40 MG Oral Tablet Delayed Release; Take 1 tablet twice daily; Therapy: 96TVK4726 to (Evaluate:25Yqa0334)  Requested for: 11YKP8362; Last    Rx:24May2016 Ordered   23  Spiriva HandiHaler 18 MCG Inhalation Capsule; USE AS DIRECTED; Therapy: 13Lpk4049 to Recorded   24  Tamsulosin HCl - 0 4 MG Oral Capsule; TAKE ONE CAPSULE BY MOUTH AT BEDTIME; Therapy: (Recorded:66Nqx2907) to Recorded   25  Terazosin HCl - 2 MG Oral Capsule; TAKE 2 CAPSULES AT BEDTIME; Therapy: 67CTX4942 to (Noelle Ivey)  Requested for: 63OOP2295; Last    Rx:93Qzu0534 Ordered   26  Torsemide 20 MG Oral Tablet; Take 1 tablet daily; Therapy: 14Apr2016-(Last:01Dec2015) Ordered   27  Vitamin B-12 TABS; 2,000 mcg daily; Therapy: (Recorded:19Jan2016) to Recorded   28  Vitamin D3 1000 UNIT Oral Tablet; TAKE 4 TABLET Daily; Last KU:34TKM1728 Ordered    The medication list was reviewed and updated today  Allergies    1  No Known Drug Allergies    2  No Known Environmental Allergies   3   No Known Food Allergies    Vitals  Vital Signs [Data Includes: Current Encounter]    Recorded: [id number] 08:51AM   Temperature 98 3 F, Tympanic   Heart Rate 68, R Radial   Pulse Quality Irregular, R Radial   Respiration 18   Respiration Quality Normal   Systolic 489, RUE, Sitting   Diastolic 64, RUE, Sitting   Height 5 ft 10 in   Weight 188 lb 0 7 oz   BMI Calculated 26 98   BSA Calculated 2 03   Pain Scale 0     Physical Exam    Pulse Exam   Posterior tibialis: right 1+ and left 1+  Dorsalis pedis: right 2+ and left 2+  Normal Capillary Refill  Extremities: bilateral ankle 2+ pitting edema, bilateral pretibial 2+ pitting edema and bilateral foot 2+ pitting edema  Lower Extremity Exam   Right Lower Extremity: Lower leg compartments are soft and without signs or symptoms for compartment syndrome  Left Lower Extremity: Lower leg compartments are soft and without signs or symptoms for compartment syndrome  Wound #2 Assessment wound #2 Location:, left great plantar toe, started on 11/2015, Care for this wound started on 6/9/2016, healed on 6/16/2016  Wound Status: healed  Physician/Provider Wound #2 Exam   I agree with the nursing assessment and documentation  Steve Couch 1: Wound Nursing Care Plan   Impaired Tissue Integrity related to: left great plantar toe   Knowledge Deficit Related To: wound   Risk for Infection related to open wound: wound   Goals   Patient will achieve 100% epithelialization:  Patient will demonstrate and verbalize knowledge of their disease process and management:  Patient will verbalize signs and symptoms of infection and when to notify physician or [facility]:    Patient will demonstrate and verbalize infection control and preventative measures:  Wound Nursing Care Interventions:   Provide moist wound healing:  Teach and evaluate effectiveness of offloading measures:  Teach patient and/or family about disease process and management methods:     Teach patient and/or family about infection control measures (gloving, hand sanitation, MDROs, disposal of soiled dressings, antibiotic therapy): Elton Organ Shake shoes before donning, no bare feet, daily visual inspection of both feet, do not soak feet, skin care, routine podiatry exams:    Teach patient on edema reducing measures:    Elevate legs above heart 30 minutes 3 times a day, walk 30 minutes daily, reduced sodium diet, diuretics as ordered, wear compression hose or wrap as ordered:  Evaluate effectiveness of all above measures every 4 weeks with Patient Specific CQI:    Other:  plan of care initiated 6/9/2016  All goals achieved plan of care resolved 6/16/2016      Procedure      Wound #2: left great plantar toe   (healed 6/16/2016)   Nurse Dressing Change:   Wound #2 The wound located on the left great plantar toe  Comments:      Provider Comments    Patient's wound is healed today  Recommend rocker bar added to his diabetic shoes  Patient will return to see Dr Neelima Beasley  Future Appointments    Date/Time Provider Specialty Site   06/16/2016 07:00 PM Fabrizio Manuel DO Internal Medicine [facility] INTERNAL MED   09/01/2016 09:30 AM Fabrizio Manuel DO Internal Medicine [facility] INTERNAL MED   07/14/2016 03:00 PM JASSON Pearl   [facility]rology [facility]   08/22/2016 09:45 AM Fabrizio [facility] Endocrinology [facility] ENDOCRINOLOGY [facility]   07/19/2016 09:30 AM Zoya Cabrera, [facility] Neurology [address]     Signatures   Electronically signed by : Irasema Boone DPM; Jun 16 2016  5:44PM EST                       (Author)

## 2018-01-11 NOTE — PROGRESS NOTES
Assessment    1  Type 2 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 (250 80,707 15) (E11 621,L97 529)   2  Secondary hyperparathyroidism (588 81) (N25 81)   3  Hyperlipidemia (272 4) (E78 5)   4  Vitamin D deficiency (268 9) (E55 9)   5  Benign essential hypertension (401 1) (I10)    Plan  Benign essential hypertension, Hyperlipidemia, Secondary hyperparathyroidism, Type 2  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, Vitamin D  deficiency    · (1) HEMOGLOBIN A1C; Status:Active; Requested for:21Nov2016;    Perform:[facility]; GTU:[id number];SXGLDZI; For:Benign essential hypertension, Hyperlipidemia, Secondary hyperparathyroidism, Type 2 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, Vitamin D deficiency; Ordered By:Joanne Minor;   · (1) HEPATIC FUNCTION PANEL; Status:Active; Requested for:21Nov2016;    Perform:[facility]; IQK:[id number];KFVFQAB; For:Benign essential hypertension, Hyperlipidemia, Secondary hyperparathyroidism, Type 2 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, Vitamin D deficiency; Ordered By:Joanne iMnor;   · (1) LIPID PANEL, FASTING; Status:Active; Requested for:21Nov2016;    Perform:[facility]; MXU:45XMS6984;SWVSTTC; For:Benign essential hypertension, Hyperlipidemia, Secondary hyperparathyroidism, Type 2 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, Vitamin D deficiency; Ordered By:Joanne Minor;   · (1) PTH N-TERMINAL (INTACT); Status:Active; Requested for:21Nov2016;    Perform:[facility]; WZU:47YMK5248;AVGJZDE; For:Benign essential hypertension, Hyperlipidemia, Secondary hyperparathyroidism, Type 2 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, Vitamin D deficiency; Ordered By:Joanne Minor;   · (1) RENAL FUNCTION PANEL; Status:Active; Requested for:21Nov2016;    Perform:[facility]; VPB:78JHZ3037;GSUIWHV;   For:Benign essential hypertension, Hyperlipidemia, Secondary hyperparathyroidism, Type 2 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, Vitamin D deficiency; Ordered By:Joanne Minor;   · (1) VITAMIN D 25-HYDROXY; Status:Active; Requested for:2016;    Perform:[facility]; [id number];HMAPCHE; For:Benign essential hypertension, Hyperlipidemia, Secondary hyperparathyroidism, Type 2 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, Vitamin D deficiency; Ordered By:Joanne Minor;   · Follow-up visit in 3 months Evaluation and Treatment  Follow-up  Status: Hold For -  Scheduling  Requested for: 2016   Ordered; For: Benign essential hypertension, Hyperlipidemia, Secondary hyperparathyroidism, Type 2 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, Vitamin D deficiency; Ordered By: Arleen Paul Performed:  Due: 2016  Black tarry stools    · FerrouSul 325 (65 Fe) MG Oral Tablet   Rx By: Kanu Lane; Dispense: 90 Days ; #:180 Tablet; Refill: 2; For: Black tarry stools; ESTEFANÍA = N; Verified Transmission to [facility]/PHARMACY # Last Updated By: Dre Martinez; 2016 9:44:42 AM    Discussion/Summary  Discussion Summary:   1  Type 2 diabetes- last A1c 7 2 within goal, continue Lantus 30 units at bedtime and Novolog 14-14-14-0  Continue with blood sugar checks and send in log in 2 weeks for review  Next A1c due in   Hyperparathyroidism/Vitamin D deficiency- last PTH level improved, calcium, phosphorus and Vitamin D normal  Continue OTC D3 4000 iu daily  3  Hypertension- BP low, denies symptoms, discuss with cardiology or nephrology regarding adjusting medication regimen  Continue current regimen for now  Creatinine stable  4  Hyperlipidemia- LDL at goal, continue Atorvastatin  5  Vitamin D deficiency- last level normal, continue OTC D3 4000 iu daily  Repeat labs in 3 months  Follow-up in 3 months     Counseling Documentation With Imm: The patient was counseled regarding diagnostic results, instructions for management, risk factor reductions, patient and family education, impressions, importance of compliance with treatment  Medication SE Review and Pt Understands Tx: Possible side effects of new medications were reviewed with the patient/guardian today  The treatment plan was reviewed with the patient/guardian  The patient/guardian understands and agrees with the treatment plan      Chief Complaint  Chief Complaint Free Text Note Form: follow up   Chief Complaint Chronic Condition [facility]: Patient is here today for follow up of chronic conditions described in HPI  History of Present Illness  HPI: Mr Triston Marquez is a 79 yo male here follow-up type 2 diabetes- on Lantus 30 units at bedtime and Novolog 14-14-14-0  Recent hospitalization for sob and CHF  Diabetes: The patient is being seen for routine follow-up of Diabetes Mellitus 2  The HbA1c was 7 2% performed on 8/2016  See Medication List for current medication(s)  See Medication List for dosage(s)  Source of information reported by review of the patient's logbook and indicates that the patient checks his blood sugar 3-4 time(s) per day  Fasting blood sugars: 113-155  Pre-prandial blood sugars: 109-200  Bedtime blood sugars: 139-200  Diabetes education performed   Topics covered in the education included reviewed hyperglycemic and hypoglycemic signs and symptoms  By report, there is fair compliance with treatment and fair symptom control  Symptoms reported by the patient include polydipsia, polyuria, insomnia and nocturia, but no extremity pain, no extremity numbness, no extremity paresthesias, no fatigue, no blurred vision, no confusion, no diarrhea, no vomiting, no nausea, no polyphagia, no edema, no dyspnea and no chest pain  (denies) Pertinent Medical and Social History: hypertension, coronary artery disease, hyperglycemia and hyperlipidemia  Hyperlipidemia (Follow-Up): Symptoms:   Medications: the patient is adherent with his medication regimen  He denies medication side effects  Medication(s): a statin   the patient's last LDL was 68 mg/dL  Vitamin D Deficiency: The patient is being seen for follow-up of vitamin D deficiency  Disease type: vitamin D deficiency  Recent laboratory results: date 8/17/16, 25-hydroxyvitamin D 59 9 ng/mL, parathyroid hormone 64 1 pg/mL, total calcium 8 8 mg/dL, phosphorus 3 7 mg/dL  Current treatment includes vitamin D3 (cholecalciferol)  Symptoms:  no fatigue, no bone pain, no muscle pain and no muscle weakness  Hypertension (Follow-Up): The patient presents for follow-up of essential hypertension  Comorbid Illnesses: coronary artery disease  Symptoms: denies impaired vision, stable dyspnea, denies chest pain and denies lower extremity edema  Associated symptoms include no headache  Medications: the patient is adherent with his medication regimen  He denies medication side effects  Medication(s): a diuretic, a beta blocking agent and an antiadrenergic agent  Review of Systems  Endo Adult ROS Male Established v2 - [facility]:   Constitutional/General: no recent weight gain, no recent weight loss, no poor energy/fatigue, no increased energy level, insomnia/sleep problems, no fever and no feeling weak  Heart: no high blood pressure, no chest pain/tightness, no rapid/racing heart rate and no palpitations  Genitourinary - Urinary frequent urination, excess urination and urinating during the night  Eyes: no blurred vision, no double vision, no bulging eyes, no gritty/scratchy eyes and no excessive tearing  Mouth / Throat: difficulty swallowing, but no hoarseness  Neck: no lumps, no swollen glands, no neck pain, no neck stiffness and no enlarged thyroid  Respiratory: wheezing, no asthma and no persistent cough  Musculoskeletal: no muscle aches/pain, no joint aches/pain and no muscle weakness  Skin & Hair: no dry skin, no acne, the hair texture was not oily, no hair loss and no excessive hair growth  Gastrointestinal: constipation, no diarrhea, no waking at night to drink and no stomach ache  Neurological: no blackouts, no weakness and no tremors  Genital: no testicular pain and no testicular lumps/bumps/mass  Endocrine: no feeling hot frequently, no feeling cold frequently, no shifts between feeling hot and cold, no cold hands or feet, no excessive sweating, no thyroid problems, blood sugar problems, excessive thirst, no excessive hunger, no change in shoe size, no nausea or vomiting and no shaky hands  ROS Reviewed:   ROS reviewed  Active Problems    1  Abnormal weight gain (783 1) (R63 5)   2  Accidental fall (E888 9) (W19 XXXA)   3  Acute bronchitis (466 0) (J20 9)   4  Acute Non-Q-wave Myocardial Infarction (410 70)   5  Ambulatory dysfunction (719 7) (R26 2)   6  Anemia (285 9) (D64 9)   7  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)   8  Angina pectoris (413 9) (I20 9)   9  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,440 9) (I25 10)   10  Asymptomatic carotid artery narrowing without infarction (433 10) (I65 29)   11  Atrial fibrillation (427 31) (I48 91)   12  Benign essential hypertension (401 1) (I10)   13  Bilateral leg edema (782 3) (R60 0)   14  Black tarry stools (578 1) (K92 1)   15  CABG   16  Cellulitis (On Exam)   17  Chest pain (786 50) (R07 9)   18  Chronic diastolic congestive heart failure (428 32,428 0) (I50 32)   19  Chronic kidney disease (585 9) (N18 9)   20  Chronic obstructive pulmonary disease (496) (J44 9)   21  CKD (chronic kidney disease), stage III (585 3) (N18 3)   22  Constipation, chronic (564 00) (K59 09)   23  Depression (311) (F32 9)   24  Diabetic neuropathy, type II diabetes mellitus (250 60,357 2) (E11 40)   25  DMII (diabetes mellitus, type 2) (250 00) (E11 9)   26  Drug eruption (693 0) (L27 0)   27  Dysphagia (787 20) (R13 10)   28  Dyspnea (786 09) (R06 00)   29  Edema (782 3) (R60 9)   30  GERD (gastroesophageal reflux disease) (530 81) (K21 9)   31  History of CVA (cerebrovascular accident) (V12 54) (Z65 97)   32   Hyperkalemia (276  7) (E87 5)   33  Hyperlipidemia (272 4) (E78 5)   34  Hypoglycemia (251 2) (E16 2)   35  Microalbuminuria (791 0) (R80 9)   36  Neuropathy (355 9) (G62 9)   37  Non-healing open wound of right groin (879 5) (S31 103A)   38  Non-proliferative diabetic retinopathy (250 50,362 03) (E11 329)   39  Peripheral vascular disease (443 9) (I73 9)   40  Postoperative state (V45 89) (Z98 89)   41  Prostate disorder (602 9) (N42 9)   42  Puncture Wound Of Foot (892 0)   43  Sacral decubitus ulcer, stage II (202 63,341 70) (L89 152)   44  Secondary hyperparathyroidism (588 81) (N25 81)   45  Sinusitis (473 9) (J32 9)   46  Skin ulcer, chronic (707 9) (L98 499)   47  Type 2 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 (250 80,707 15) (E11 621,L97 529)   48  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)   49  Vitamin D deficiency (268 9) (E55 9)    Past Medical History    1  History of Denial Of Any Significant Medical History   2  History of angina (V12 59) (Z86 79)   3  History of blood transfusion (V15 89) (Z92 89)   4  History of PTCA (V45 82) (Z98 61)   5  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)   6  History of Pulmonary edema (514) (J81 1)  Active Problems And Past Medical History Reviewed: The active problems and past medical history were reviewed and updated today  Surgical History    1  History of CABG   2  CABG   3  History of Cataract Surgery   4  History of Cath Stent Placement   5  History of Hernia Repair   6  History of Surgery For Aneurysm   7  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  Surgical History Reviewed: The surgical history was reviewed and updated today  Family History  Mother    1  Family history of Diabetes Mellitus (V18 0)  Sister    2  Family history of malignant neoplasm (V16 9) (Z80 9)  Spouse    3  Family history of cardiac disorder (V17 49) (Z82 49)  Family History    4  Family history of Cancer   5   Family history of Coronary Artery Disease (V17 49)   6  Family history of Diabetes Mellitus (V18 0)  Family History Reviewed: The family history was reviewed and updated today  Social History    · Denied: History of Always uses seat belt   · Caffeine use (V49 89) (F15 90)   · Daily caffeine consumption, 1 serving a day   · Does not exercise (V69 0) (Z72 3)   · Former smoker (L75 24) (C39 800)   · Marital History - Currently    · No drug use   · Stopped Drinking Alcohol  Social History Reviewed: The social history was reviewed and updated today  Current Meds   1  Advair Diskus 250-50 MCG/DOSE Inhalation Aerosol Powder Breath Activated; INHALE 1   PUFF TWICE DAILY  RINSE MOUTH AFTER USE; Therapy: 18Gld6029 to (Evaluate:34Jwi5211) Recorded   2  Albuterol Sulfate (2 5 MG/3ML) 0 083% Inhalation Nebulization Solution; USE 1 UNIT   DOSE VIA NEBULIZER  4 TIMES A DAY AS NEEDED Recorded   3  AmLODIPine Besylate 2 5 MG Oral Tablet; Take 1 tablet daily; Therapy: 40BEF0339 to (Evaluate:71Ikf0444)  Requested for: 14Bno1664; Last   Rx:11Kqk6945 Ordered   4  Aspirin Low Dose 81 MG TABS; Take 1 tablet daily Recorded   5  Atorvastatin Calcium 40 MG Oral Tablet; TAKE 1 TABLET DAILY AT BEDTIME; Therapy: 95FBW8979 to (Evaluate:05Jan2017)  Requested for: 43HUY2379; Last   Rx:11Jan2016 Ordered   6  BD Pen Needle Mae U/F 32G X 4 MM Miscellaneous; four times daily; Therapy: 96FKD1061 to (Evaluate:13Nov2016)  Requested for: 04Nlx9936; Last   Rx:22Fve5928 Ordered   7  Centrum Silver Oral Tablet; TAKE 1 TABLET DAILY; Therapy: (Recorded:93Sdw1460) to Recorded   8  Cinnamon 500 MG Oral Tablet; 4 tablets daily; Therapy: (Stewart Monk) to Recorded   9  Citalopram Hydrobromide 10 MG Oral Tablet; TAKE 1 TABLET AT BEDTIME; Therapy: 22BAQ5275 to (Last QUINONES:44BEN0943)  Requested for: 62DKS7845 Ordered   10  Clopidogrel Bisulfate 75 MG Oral Tablet; Take 1 tablet daily;     Therapy: 46JCW8062 to (María Salvador)  Requested for: 01QDQ5598; Last Rx:45Cax6969 Ordered   11  Daliresp 500 MCG Oral Tablet; TAKE 1 TABLET DAILY; Therapy: (Recorded:73Dgx5925) to Recorded   12  FerrouSul 325 (65 Fe) MG Oral Tablet; TAKE 1 TABLET DAILY; Therapy: (Casandra Loop) to Recorded   13  FerrouSul 325 (65 Fe) MG Oral Tablet; TAKE 1 TABLET TWICE DAILY WITH MEALS; Therapy: 30FVK2856 to (Richard Sep)  Requested for: 91DMS6048; Last    Rx:82Vvd8604 Ordered   14  Fish Oil 1200 MG Oral Capsule; three times daily; Therapy: (Casandra Loop) to Recorded   15  Isosorbide Mononitrate ER 30 MG Oral Tablet Extended Release 24 Hour; TAKE 1    TABLET DAILY; Therapy: (Recorded:03Mar2016) to Recorded   16  Lantus SoloStar 100 UNIT/ML Subcutaneous Solution Pen-injector; Inject 35 units at    bedtime as directed by doctor; Therapy: (Recorded:67Wwd0956) to Recorded   17  Metoprolol Tartrate 50 MG Oral Tablet; take 1/2 tablet by mouth twice a dayl; Therapy: 28Mar2011-(Evaluate:09Apr2017)  Requested for: 86Iwh8445 Ordered   18  Multi-betic Diabetes Oral Tablet; TAKE 1 TABLET DAILY; Therapy: (Casandra Loop) to Recorded   19  NovoLOG FlexPen 100 UNIT/ML Subcutaneous Solution Pen-injector; Inject 12 units @    breakfastk, 12u at lunch and 14u at supper as directed by your    doctor; Therapy: (Recorded:25Ing9726) to Recorded   20  Pantoprazole Sodium 40 MG Oral Tablet Delayed Release; Take 1 tablet twice daily; Therapy: 28MQJ1830 to (Evaluate:60Ogs9009)  Requested for: 15Jwr1172; Last    Rx:74Fdx5048 Ordered   21  Spiriva HandiHaler 18 MCG Inhalation Capsule; USE AS DIRECTED; Therapy: 58Djx3561 to Recorded   22  Tamsulosin HCl - 0 4 MG Oral Capsule; TAKE ONE CAPSULE BY MOUTH AT BEDTIME; Therapy: (Recorded:11Txw3753) to Recorded   23  Terazosin HCl - 2 MG Oral Capsule; TAKE 2 CAPSULES AT BEDTIME; Therapy: 87SZA6519 to (467 20 767)  Requested for: 27GNA3424; Last    Rx:52Ljr5765 Ordered   24  Torsemide 20 MG Oral Tablet;  Take 1 tablet daily; Therapy: (Branden Garsia) to  Requested for: 24Ptl7010 Recorded   25  Vitamin B-12 TABS; 2,000 mcg daily; Therapy: (Recorded:92Tgk1409) to Recorded   26  Vitamin D3 2000 UNIT Oral Capsule; 2 daily equals 4000 iu daily; Therapy: (Recorded:94Dji7430) to Recorded  Medication List Reviewed: The medication list was reviewed and updated today  Allergies    1  No Known Drug Allergies    2  No Known Environmental Allergies   3  No Known Food Allergies    Vitals  Vital Signs    Recorded: 07MBS3136 92:02YG   Systolic 90   Diastolic 40   Heart Rate 59   Height 5 ft 9 in   Weight 186 lb 2 24 oz   BMI Calculated 27 49   BSA Calculated 2     Physical Exam    Constitutional   General appearance: No acute distress, well appearing and well nourished  Eyes   Conjunctiva and lids: No swelling, erythema, or discharge  Pupils: Equal, round and reactive to light  The sclera are anicteric  Extraocular movements are intact  Ears, Nose, Mouth, and Throat   External inspection of ears, nose and lips: Normal     Oropharynx: Normal with no erythema, edema, exudate or lesions  Exam of Head: The head is atraumatic and normocephalic  Neck: The neck is supple  The thyroid is normal in size with no palpable nodules  Pulmonary   Auscultation of lungs: Clear to auscultation bilaterally with normal chest expansion  Cardiovascular   Auscultation of heart: Normal rate and rhythm with no murmurs, gallops or rubs  Examination of pulses: Dorsalis pedal pulses are +2 and equal bilaterally  Examination of Carotids: No bruits  Abdomen   Abdomen: Abnormal   The abdomen was obese  Lymphatic   Palpation of lymph nodes: No supraclavicular or suboccipital lymphadenopathy  Musculoskeletal   Inspection/palpation of joints, bones, and muscles: Muscle bulk and tone is normal     Skin   Skin and subcutaneous tissue: Normal skin temperature and color  Neurologic   Reflexes: 2+ and symmetric      Motor Strength: Strength is 5/5 bilaterally  Psychiatric   Orientation to person, place and time: Normal     Mood and affect: Affect and attention span are normal        Results/Data  (1) PTH N-TERMINAL (INTACT) 49GSF8136 12:24PM [employer] Order Number: [id number]     Test Name Result Flag Reference   PARATHYROID HORMONE INTACT 64 1 pg/mL  14 0-72 0     (1) RENAL FUNCTION PANEL 17Aug2016 12:24PM [employer] Order Number: [id number]     Test Name Result Flag Reference   ANION GAP (CALC) 5 mmol/L  4-13   ALBUMIN 3 4 g/dL L 3 5-5 0   BLOOD UREA NITROGEN 38 mg/dL H 5-25   National Kidney Disease Education Program recommendations are as follows:  GFR calculation is accurate only with a steady state creatinine  Chronic Kidney disease less than 60 ml/min/1 73 sq  meters  Kidney failure less than 15 ml/min/1 73 sq  meters  CALCIUM 8 8 mg/dL  8 3-10 1   CHLORIDE 100 mmol/L  100-108   CARBON DIOXIDE 33 mmol/L H 21-32   CREATININE 1 68 mg/dL H 0 60-1 30   Standardized to IDMS reference method   GLUCOSE,RANDM 150 mg/dL H    POTASSIUM 4 4 mmol/L  3 5-5 3   eGFR Non-African American 39 2 ml/min/1 73sq m     SODIUM 138 mmol/L  136-145   PHOSPHORUS 3 7 mg/dL  2 3-4 1     (1) VITAMIN D 25-HYDROXY 17Aug2016 12:24PM [employer] Order Number: [id number]     Test Name Result Flag Reference   VIT D 25-HYDROX 59 9 ng/mL  30 0-100 0   This assay is a certified procedure of the CDC Vitamin D Standardization Certification Program (VDSCP)     Deficiency <20ng/ml   Insufficiency 20-30ng/ml   Sufficient  ng/ml     *Patients undergoing fluorescein dye angiography may retain small amounts of fluorescein in the body for 48-72 hours post procedure  Samples containing fluorescein can produce falsely elevated Vitamin D values  If the patient had this procedure, a specimen should be resubmitted post fluorescein clearance       (1) HEMOGLOBIN A1C 09CNE3276 12:24PM Adriano Lard   [employer] Order Number: [id number]     Test Name Result Flag Reference   HEMOGLOBIN A1C 7 2 % H 4 2-6 3   EST  AVG   GLUCOSE 160 mg/dl       Future Appointments    Date/Time Provider Specialty Site   09/01/2016 09:30 AM Jaren Pfeiffer DO Internal Medicine [facility] INTERNAL MED   01/27/2017 09:30 AM Mj Jameson MD Neurology [address]     Signatures   Electronically signed by : Gabrielle Smith, ; Aug 22 2016 10:34AM EST                       (Author)    Electronically signed by : JASSON Rivers ; Aug 22 2016 10:43AM EST                       (Review)

## 2018-01-12 NOTE — PROGRESS NOTES
Assessment    1  Black tarry stools (578 1) (K92 1)   2  Chronic obstructive pulmonary disease (496) (J44 9)   3  Constipation, chronic (564 00) (K59 00)   4  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,440 9) (I25 10)    Plan  Arteriosclerotic cardiovascular disease    · Hemoccult Screening - POC; Status:Active; Requested for:22Jan2016;   DMII (diabetes mellitus, type 2)    · *VB-Foot Exam; Status:Active - Retrospective By Protocol Authorization; Requested  for:22Jan2016;     Discussion/Summary    #1  Black hard stools with problems with constipation  This may be secondary to patient's iron therapy and the Hemoccult was performed today in the office which was negative for blood  Patient was told to continue with the Yanni-Colace a daily basis and he and his wife were instructed to get a prescription for Dulcolax laxative and to use these to help with transient and hopefully precipitated movement  With him clearing out his bowels the hopes are that his appetite will improve  They're to call the office on Monday with an update  #2  COPD with O2 desaturation with movement and at night  Patient has an oxygen concentrator that he should be using at nighttime as directed by his pulmonologist  This would be her both his COPD and his chronic congestive heart failure  Patient's wife will be checking in with his pulmonologist    #3  Atherosclerotic cardiovascular disease  Patient is stable at this point in time  The patient was counseled regarding diagnostic results, instructions for management, risk factor reductions, prognosis, patient and family education, impressions, risks and benefits of treatment options, importance of compliance with treatment  Possible side effects of new medications were reviewed with the patient/guardian today  The treatment plan was reviewed with the patient/guardian   The patient/guardian understands and agrees with the treatment plan      Chief Complaint  Patient is here today for a face to face evaluation for his oxygen  History of Present Illness  HPI: Patient is an 20-year-old male with a history of chronic obstructive pulmonary disease, CHF currently  Patient is here today for are signature on papers for an oxygen concentrator  These were sent by his medical provider for his oxygen and apparently be possibly denying him to concentrator    We have no information as to his oxygen saturation and did not prescribe this for his patient and this was done by his pulmonologist  We in reviewing the papers exceeded it's not for are signature but for his signature and should be sent back to Medicare and he will check with his physician who has taken over the practice for his pulmonologist  Patient is complaining of problems with constipation, patient states this is in a difficult situation since she's been on the iron for anemia  He is also complaining about black stools  He did have a rectal exam in the office today which does show some black stool which is heme negative  Hospital Based Practices Required Assessment:   Pain Assessment   the patient states they do not have pain  The pain is located in the Diffuse arthritic aches and pains  The patient describes the pain as aching  (on a scale of 0 to 10, the patient rates the pain at 3 )   Abuse And Domestic Violence Screen    Yes, the patient is safe at home  The patient states no one is hurting them  Depression And Suicide Screen   No, the patient has not felt depressed in the past 7 days  Primary Language is  English  Readiness To Learn: Receptive  Barriers To Learning: none     Preferred Learning: verbal   Education Completed: disease/condition, medications and further treatment/follow-up   Teaching Method: verbal   Person Taught: patient   Evaluation Of Learning: verbalized/demonstrated understanding      Review of Systems    Constitutional: feeling poorly and feeling tired, but no fever, no recent weight gain, no chills and no recent weight loss  ENT: no complaints of earache, no loss of hearing, no nosebleeds or nasal discharge, no sore throat or hoarseness  Cardiovascular: intermittent leg claudication and lower extremity edema, but the heart rate was not slow, no chest pain, the heart rate was not fast and no palpitations  Respiratory: shortness of breath, cough, wheezing and shortness of breath during exertion, but no orthopnea and no PND  Gastrointestinal: abdominal pain and nausea, but no vomiting, no diarrhea and no blood in stools  Genitourinary: urinary hesitancy, but no dysuria, no genital lesions, no incontinence, no nocturia and no inadequacy of penile erections  Musculoskeletal: arthralgias, limb pain and joint stiffness, but no joint swelling, no myalgias and no limb swelling  Integumentary: skin lesion and Inadequate healing to lesion on his left foot and under podiatric care, but no rashes, no itching, no dry skin and no skin wound  Neurological: no complaints of headache, no confusion, no numbness or tingling, no dizziness or fainting  ROS reviewed  Active Problems    1  Acute bronchitis (466 0) (J20 9)   2  Acute Non-Q-wave Myocardial Infarction (410 70)   3  Anemia (285 9) (D64 9)   4  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)   5  Angina pectoris (413 9) (I20 9)   6  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,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 9) (I25 10)   7  Asymptomatic carotid artery narrowing without infarction (433 10) (I65 29)   8  Atrial fibrillation (427 31) (I48 91)   9  Benign essential hypertension (401 1) (I10)   10  Black tarry stools (578 1) (K92 1)   11  CABG   12  Chronic diastolic congestive heart failure (428 32,428 0) (I50 32)   13  Chronic kidney disease (585 9) (N18 9)   14  Chronic obstructive pulmonary disease (496) (J44 9)   15  DMII (diabetes mellitus, type 2) (250 00) (E11 9)   16  Drug eruption (693 0) (L27 0)   17  Dysphagia (787 20) (R13 10)   18   Dyspnea (786 09) (R06 00) 19  Edema (782 3) (R60 9)   20  GERD (gastroesophageal reflux disease) (530 81) (K21 9)   21  Hyperlipidemia (272 4) (E78 5)   22  Hypoglycemia (251 2) (E16 2)   23  Microalbuminuria (791 0) (R80 9)   24  Neuropathy (355 9) (G62 9)   25  Non-healing open wound of right groin (879 5) (S31 103A)   26  Non-proliferative diabetic retinopathy (250 50,362 03) (E11 329)   27  Peripheral vascular disease (443 9) (I73 9)   28  Postoperative state (V45 89) (Z98 89)   29  Prostate disorder (602 9) (N42 9)   30  Puncture Wound Of Foot (892 0)    Social History    · Caffeine use (V49 89) (F15 90)   · Former smoker (W31 40) (Z87 891)   · quite early 1990's   · Marital History - Currently    · No drug use   · Stopped Drinking Alcohol    Current Meds   1  Advair Diskus 250-50 MCG/DOSE Inhalation Aerosol Powder Breath Activated; INHALE 1   PUFF TWICE DAILY  RINSE MOUTH AFTER USE; Therapy: 22Rxc9437 to (Evaluate:55Uts6392) Recorded   2  Albuterol Sulfate (2 5 MG/3ML) 0 083% Inhalation Nebulization Solution; USE 1 UNIT   DOSE VIA NEBULIZER  4 TIMES A DAY AS NEEDED Recorded   3  AmLODIPine Besylate 2 5 MG Oral Tablet; Take 1 tablet daily; Therapy: 40HPM7618 to (Evaluate:64Mou6355)  Requested for: 32Hvc7482; Last   Rx:67Dkh6983 Ordered   4  Aspirin Low Dose 81 MG Oral Tablet; Take 1 tablet daily Recorded   5  Atorvastatin Calcium 40 MG Oral Tablet; TAKE 1 TABLET DAILY AT BEDTIME; Therapy: 10WHJ4398 to (Evaluate:05Jan2017)  Requested for: 55EYO5179; Last   Rx:11Jan2016 Ordered   6  Atorvastatin Calcium 40 MG Oral Tablet; Therapy: (Recorded:50Jky2932) to Recorded   7  Centrum Silver Oral Tablet; Therapy: (Recorded:30Oct2014) to Recorded   8  Cinnamon TABS; take one tablet daily; Therapy: (Recorded:44Lqn6724) to Recorded   9  Clopidogrel Bisulfate 75 MG Oral Tablet; Take 1 tablet daily; Therapy: 12VHB8977 to (Evaluate:82Wvs8292)  Requested for: 47ESZ6363; Last   Rx:82Key1028 Ordered   10   Daliresp 500 MCG Oral Tablet; TAKE 1 TABLET DAILY; Therapy: (Recorded:06Odi2756) to Recorded   11  FerrouSul 325 (65 Fe) MG Oral Tablet; TAKE 1 TABLET TWICE DAILY WITH MEALS; Therapy: 48KWZ7426 to (Yenny Ventura)  Requested for: 21LLS5096; Last    Rx:83Hdb0932 Ordered   12  Fish Oil 1200 MG Oral Capsule; Therapy: (Recorded:66Zgz1524) to Recorded   13  Isosorbide Mononitrate ER 60 MG Oral Tablet Extended Release 24 Hour; TAKE 1    TABLET ONCE DAILY; Therapy: (Recorded:58Ncm8804) to Recorded   14  Klor-Con M20 20 MEQ Oral Tablet Extended Release; TAKE 2 TABLETS DAILY     Requested for: 71TNA0117; Last Rx:22Jub3754 Ordered   15  Lantus SoloStar 100 UNIT/ML Subcutaneous Solution Pen-injector; 35 daily at bedtime; Therapy: 66OGW5130 to (Evaluate:14Mar2016) Recorded   16  Metoprolol Tartrate 50 MG Oral Tablet; take 1 tablet twice a day; Therapy: 97DOM3110 to (Kathi Lopez)  Requested for: 99 293557; Last    Rx:06Apr2015 Ordered   17  NovoLOG 100 UNIT/ML Subcutaneous Solution; 12 units before breakfast and lunch, 16    before supper; Therapy: (Recorded:12Lnj6949) to Recorded   18  Pantoprazole Sodium 40 MG Oral Tablet Delayed Release; Take 1 tablet twice daily; Therapy: 05CCC7357 to (Last JQ:09EFC9611)  Requested for: 23Oct2015 Ordered   19  Spiriva HandiHaler 18 MCG Inhalation Capsule; USE AS DIRECTED; Therapy: 16Xvc4782 to Recorded   20  Tamsulosin HCl - 0 4 MG Oral Capsule; TAKE ONE CAPSULE BY MOUTH AT BEDTIME; Therapy: (Recorded:56Wcs6489) to Recorded   21  Terazosin HCl - 2 MG Oral Capsule; TAKE 2 CAPSULES AT BEDTIME; Therapy: 81VYU0342 to (Nicholasnstine Marilou)  Requested for: 24NRC3412; Last    Rx:84Rff4860 Ordered   22  Torsemide 20 MG Oral Tablet; 20 mg twice daily; Last Rx:70Bnk6523 Ordered   23  Vitamin B-12 TABS; 2,000 mcg daily; Therapy: (Recorded:19Jan2016) to Recorded   24  Vitamin D3 1000 UNIT Oral Tablet; Therapy: (Recorded:13Mex6269) to Recorded    Allergies    1   No Known Drug Allergies    2  No Known Environmental Allergies   3  No Known Food Allergies    Vitals   Recorded: 17TJX2797 01:19PM   Temperature 98 F   Heart Rate 59   Respiration 16   Systolic 091   Diastolic 64   Height 5 ft 10 in   Weight 195 lb 6 08 oz   BMI Calculated 28 03   BSA Calculated 2 07   O2 Saturation 98     Physical Exam    Constitutional   General appearance: Abnormal   Mildly anxious 80-year-old male who is awake alert  Eyes   Conjunctiva and lids: No swelling, erythema, or discharge  Pupils and irises: Equal, round and reactive to light  Ears, Nose, Mouth, and Throat   External inspection of ears and nose: Normal     Otoscopic examination: Tympanic membrance translucent with normal light reflex  Canals patent without erythema  Nasal mucosa, septum, and turbinates: Abnormal   Mild  Hematin nares with clear nasal discharge  Oropharynx: Abnormal   Slight erythema to posterior a with a thick white postnasal drip  Pulmonary   Respiratory effort: No increased work of breathing or signs of respiratory distress  Auscultation of lungs: Abnormal   Decreased breath sounds bilaterally with faint rhonchi heard anteriorly that cleared with cough  Cardiovascular   Palpation of heart: Normal PMI, no thrills  Auscultation of heart: Normal rate and rhythm, normal S1 and S2, without murmurs  Examination of extremities for edema and/or varicosities: Normal     Carotid pulses: Abnormal   Bilateral carotid bruits  Abdomen   Abdomen: Abnormal   Obese slightly distended and full to palpation with decreased bowel sounds x4 quadrants  Liver and spleen: No hepatomegaly or splenomegaly  Lymphatic   Palpation of lymph nodes in neck: No lymphadenopathy  Musculoskeletal   Gait and station: Abnormal   Antalgic gait walking with a boot on left foot for nonhealing ulcer  Digits and nails: Abnormal   Diffuse arthritic changes hands and digits     Inspection/palpation of joints, bones, and muscles: Abnormal  Diffuse osteoarthritis  Skin   Skin and subcutaneous tissue: Normal without rashes or lesions  Neurologic   Cranial nerves: Cranial nerves 2-12 intact  Reflexes: Abnormal   Absent patellar and Achilles deep tendon reflexes  Sensation: Abnormal     Psychiatric   Orientation to person, place and time: Normal     Mood and affect: Abnormal   Mildly disgruntled mood  Diabetic Foot Screen: Abnormal        Future Appointments    Date/Time Provider Specialty Site   07/19/2016 09:30 AM Marshel Klinefelter, MD Neurology [facility]   02/16/2016 10:10 AM JASSON Dangelo  Endocrinology [facility]OCRIN[facility]Y [facility]   04/14/2016 11:00 AM Martir Pradhan DO Internal Medicine [facility] INTERNAL MED   06/16/2016 09:00 AM JASSON Jane   Vascular Surgery [facility] NEUROSURGICAL     Signatures   Electronically signed by : Seema Guaman DO; Jan 22 2016  1:59PM EST                       (Author)

## 2018-01-12 NOTE — PROGRESS NOTES
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)   2  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,440 9) (I25 10)   3  Asymptomatic carotid artery narrowing without infarction (433 10) (I65 29)   4  Atrial fibrillation (427 31) (I48 91)   5  Benign essential hypertension (401 1) (I10)   6  Bilateral leg edema (782 3) (R60 0)   7  Chronic diastolic congestive heart failure (428 32,428 0) (I50 32)   8  Dyspnea (786 09) (R06 00)   9  Peripheral vascular disease (443 9) (I73 9)    Plan  Peripheral vascular disease    · Follow-up visit in 6 months Evaluation and Treatment  Follow-up  Status: Complete   Done: 88VPH0477   Ordered; For: Peripheral vascular disease; Ordered By: Blanche Esquivel Performed:  Due: 48CTL0075; Last Updated By: Brittany Sears; 6/29/2016 11:52:51 AM    Discussion/Summary  Cardiology Discussion Summary Free Text Note Form [facility]:   1  Chronic diastolic congestive heart failure multiple exacerbation, he does require significant diuretic doses  Development of right heart failure as well  Currently euvolemic  The ventricular systolic function is normal on echocardiogram with inferior basal hypokinesis on echocardiogram this year  There is mild mitral insufficiency  Significant left atrial enlargement and mild to moderate tricuspid insufficiency with moderate pulmonary hypertension  Chronic kidney disease, most recent creatinine 1 3, and a GFR around 50  Tolerated diuretic therapy  2  Coronary artery disease is status post bypass surgery, cardiac catheterization last year revealed the LIMA to the LAD was patent, saphenous vein graft to the PDA was patent saphenous vein graft to the D1 and OM 3 was patent  Previous the stent from last year to this circumflex into the first marginal was patent as well  Continue nitrates  Lipids recently checked, total cholesterol 134, HDL 50, LDL 68, triglycerides 78 on statin therapy  Adequate control    3  Chronic atrial fibrillation, on beta blocker plus dual antiplatelets therapy  Patient previously reluctant to take full anticoagulation because of history of GI bleeding on warfarin  Additionally 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  Recent MRI suggests some mild enlargement  This is followed by neurosurgery  Not a good candidate for any intervention in that regard  4  Peripheral vascular disease, anterior cerebral aneurysm as stated above  He also has carotid disease, interestingly he had a CT angiogram demonstrated and 70-80% stenosis at the right internal carotid artery and mild disease in the left  Carotid duplex suggested bilateral disease 50-69%  Continue dual antiplatelets therapy and statin therapy  There are no focal neurological deficits  Chief Complaint  Chief Complaint Free Text Note Form: Pt  here for hospital follow up after recent admission for CHF  Pt  does have some SOB if he over exerts, no other cardiac complaints  History of Present Illness  Cardiology HPI Free Text Note Form [facility]: Cardiology follow-up after recent hospitalization for congestive heart failure and COPD exacerbation  He has had multiple admissions this year  Since discharge his been doing better, he is now on twice a day diuretic regimen  His dyspnea class I  No chest discomfort  No orthopnea or PND  His comply medications  States being compliant with a low sodium diet and fluid restrictions  His also uses his inhalers  His weight has been stable  Today's weight is 188      Review of Systems  Cardiology Male ROS:     Cardiac: rhythm problems and has swelling in the , but no chest pain, no fainting/blackouts, no heart murmur present, no palpitations present, no syncope/fainting, no AM fatigue and no witnessed apnea episodes  Skin: No complaints of nonhealing sores or skin rash  Genitourinary: kidney problems, but no blood in urine   Psychological: depression and anxiety     General: lack of energy/fatigue, but no trouble sleeping, no appetite changes, no changes in weight, no fever, no night sweats and no frequent infections  Respiratory: shortness of breath, cough/sputum, wheezing and phlegm, but no hemoptysis  HEENT: no serious eye problems and no hearing problems   Gastrointestinal: no liver problems, no bloody stools, no abdonimal pain and no rectal bleeding   Hematologic: No complaints of bleeding disorders, anemia, blood clots, or excessive brusing  , no bleeding disorders and no excessive bruising   Neurological: numbnes and daytime sleepiness, but no tingling, no weakness, no seizures, no headaches and no dizziness   Musculoskeletal: arthritis No myalgias  Active Problems  Problems    1  Abnormal weight gain (783 1) (R63 5)   2  Accidental fall (E888 9) (W19 XXXA)   3  Acute bronchitis (466 0) (J20 9)   4  Acute Non-Q-wave Myocardial Infarction (410 70)   5  Ambulatory dysfunction (719 7) (R26 2)   6  Anemia (285 9) (D64 9)   7  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)   8  Angina pectoris (413 9) (I20 9)   9  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,440 9) (I25 10)   10  Asymptomatic carotid artery narrowing without infarction (433 10) (I65 29)   11  Atrial fibrillation (427 31) (I48 91)   12  Benign essential hypertension (401 1) (I10)   13  Bilateral leg edema (782 3) (R60 0)   14  Black tarry stools (578 1) (K92 1)   15  CABG   16  Cellulitis (On Exam)   17  Chest pain (786 50) (R07 9)   18  Chronic diastolic congestive heart failure (428 32,428 0) (I50 32)   19  Chronic kidney disease (585 9) (N18 9)   20  Chronic obstructive pulmonary disease (496) (J44 9)   21  Constipation, chronic (564 00) (K59 09)   22  Depression (311) (F32 9)   23  Diabetic neuropathy, type II diabetes mellitus (250 60,357 2) (E11 40)   24  DMII (diabetes mellitus, type 2) (250 00) (E11 9)   25  Drug eruption (693 0) (L27 0)   26  Dysphagia (787 20) (R13 10)   27  Dyspnea (786 09) (R06 00)   28   Edema (782  3) (R60 9)   29  GERD (gastroesophageal reflux disease) (530 81) (K21 9)   30  Hyperkalemia (276 7) (E87 5)   31  Hyperlipidemia (272 4) (E78 5)   32  Hypoglycemia (251 2) (E16 2)   33  Microalbuminuria (791 0) (R80 9)   34  Neuropathy (355 9) (G62 9)   35  Non-healing open wound of right groin (879 5) (S31 103A)   36  Non-proliferative diabetic retinopathy (250 50,362 03) (E11 329)   37  Peripheral vascular disease (443 9) (I73 9)   38  Postoperative state (V45 89) (Z98 89)   39  Prostate disorder (602 9) (N42 9)   40  Puncture Wound Of Foot (892 0)   41  Sacral decubitus ulcer, stage II (678 03,486 31) (L89 152)   42  Secondary hyperparathyroidism (588 81) (N25 81)   43  Sinusitis (473 9) (J32 9)   44  Skin ulcer, chronic (707 9) (L98 499)   45  Type 2 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 (250 80,707 15) (E11 621,L97 529)   46  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)   47  Vitamin D deficiency (268 9) (E55 9)    Past Medical History  Problems    1  History of Denial Of Any Significant Medical History   2  History of angina (V12 59) (Z86 79)   3  History of blood transfusion (V15 89) (Z92 89)   4  History of PTCA (V45 82) (Z98 61)   5  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)   6  History of Pulmonary edema (514) (J81 1)  Active Problems And Past Medical History Reviewed: The active problems and past medical history were reviewed and updated today  Surgical History  Problems    1  History of CABG   2  CABG   3  History of Cataract Surgery   4  History of Cath Stent Placement   5  History of Hernia Repair   6  History of Surgery For Aneurysm   7  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  Surgical History Reviewed: The surgical history was reviewed and updated today  Family History  Mother    1  No pertinent family history  Spouse    2  Family history of cardiac disorder (V17 49) (Z82 49)  Family History    3  Family history of Cancer   4  Family history of Coronary Artery Disease (V17 49)   5  Family history of Diabetes Mellitus (V18 0)  Family History Reviewed: The family history was reviewed and updated today  Social History  Problems    · Caffeine use (V49 89) (F15 90)   · Former smoker (V15 82) (R11 714)   · Marital History - Currently    · No drug use   · Stopped Drinking Alcohol  Social History Reviewed: The social history was reviewed and is unchanged  Current Meds   1  Advair Diskus 250-50 MCG/DOSE Inhalation Aerosol Powder Breath Activated; INHALE 1   PUFF TWICE DAILY  RINSE MOUTH AFTER USE; Therapy: 09Hhp9536 to (Evaluate:65Syi1759) Recorded   2  Albuterol Sulfate (2 5 MG/3ML) 0 083% Inhalation Nebulization Solution; USE 1 UNIT   DOSE VIA NEBULIZER  4 TIMES A DAY AS NEEDED Recorded   3  AmLODIPine Besylate 2 5 MG Oral Tablet; Take 1 tablet daily; Therapy: 92CLX4155 to (Evaluate:82Qpp8659)  Requested for: 85Fvt9016; Last   Rx:84Wcg9647 Ordered   4  Aspirin Low Dose 81 MG TABS; Take 1 tablet daily Recorded   5  Atorvastatin Calcium 40 MG Oral Tablet; TAKE 1 TABLET DAILY AT BEDTIME; Therapy: 85LQN2720 to (Evaluate:05Jan2017)  Requested for: 01RRX9490; Last   Rx:11Jan2016 Ordered   6  Centrum Silver Oral Tablet; Therapy: (Recorded:49Rrr2300) to Recorded   7  Cinnamon TABS; take one tablet daily; Therapy: (Recorded:17Axr6114) to Recorded   8  Citalopram Hydrobromide 10 MG Oral Tablet; TAKE 1 TABLET AT BEDTIME; Therapy: 60TJV5500 to (Last BW:03WBA8296)  Requested for: 23NDW1476 Ordered   9  Citalopram Hydrobromide 10 MG Oral Tablet; TAKE 1 TABLET AT BEDTIME; Therapy: 68FRW9419 to (Last Rx:64Yry6216)  Requested for: 15WRI4882 Ordered   10  Clopidogrel Bisulfate 75 MG Oral Tablet; Take 1 tablet daily; Therapy: 06HDK3814 to (Isabel Sera)  Requested for: 60FMQ9595; Last    Rx:27Rpc9150 Ordered   11  Daliresp 500 MCG Oral Tablet; TAKE 1 TABLET DAILY; Therapy: (Recorded:22Evj6459) to Recorded   12  FerrouSul 325 (65 Fe) MG Oral Tablet; TAKE 1 TABLET TWICE DAILY WITH MEALS; Therapy: 17HAH6998 to (Michael Swartz)  Requested for: 51JDY5021; Last    Rx:25Pkz9201 Ordered   13  Fish Oil 1200 MG Oral Capsule; Therapy: (Recorded:35Wpb9842) to Recorded   14  Isosorbide Mononitrate ER 30 MG Oral Tablet Extended Release 24 Hour; TAKE 1    TABLET DAILY; Therapy: (Recorded:03Mar2016) to Recorded   15  Lantus SoloStar 100 UNIT/ML Subcutaneous Solution Pen-injector; Inject 35 units at    bedtime as directed by doctor; Therapy: (Recorded:29Uzo0103) to Recorded   16  Metoprolol Tartrate 50 MG Oral Tablet; take 1/2 tablet by mouth twice a dayl; Therapy: 28Mar2011-(Evaluate:09Apr2017)  Requested for: 05Efj4036 Ordered   17  Multi-betic Diabetes Oral Tablet; Therapy: (Recorded:95Yuj3728) to Recorded   18  Nitrostat 0 4 MG Sublingual Tablet Sublingual; PLACE 1 TABLET UNDER THE TONGUE    EVERY 5 MINUTES FOR UP TO 3 DOSES AS NEEDED FOR CHEST PAIN  CALL    911 IF PAIN PERSISTS; Therapy: 17GTD5390 to (Cale Gary)  Requested for: 05Apr2016; Last    Rx:05Apr2016 Ordered   19  NovoLOG FlexPen 100 UNIT/ML Subcutaneous Solution Pen-injector; imject 14 units    before breakfast and lunch and 14 before dinner; Therapy: 99PAP5531 to (Anabel Lary)  Requested for: 55QIX6816; Last    Rx:24May2016 Ordered   20  NovoLOG FlexPen 100 UNIT/ML Subcutaneous Solution Pen-injector; Inject 12 units @    breakfastk, 12u at lunch and 14u at supper as directed by your    doctor; Therapy: (Recorded:38Mzx4942) to Recorded   21  Pantoprazole Sodium 40 MG Oral Tablet Delayed Release; Take 1 tablet twice daily; Therapy: 85ZCY1860 to (Evaluate:44Fng5564)  Requested for: 83SHC4994; Last    Rx:24May2016 Ordered   22  Spiriva HandiHaler 18 MCG Inhalation Capsule; USE AS DIRECTED; Therapy: 36Rzi6401 to Recorded   23  Tamsulosin HCl - 0 4 MG Oral Capsule; TAKE ONE CAPSULE BY MOUTH AT BEDTIME;     Therapy: (Recorded:51Qgk8573) to Recorded   24  Terazosin HCl - 2 MG Oral Capsule; TAKE 2 CAPSULES AT BEDTIME; Therapy: 02MCC4211 to (Ramin Zohreh)  Requested for: 65UMQ8609; Last    Rx:55Vii2498 Ordered   25  Torsemide 20 MG Oral Tablet; Take 1 tablet daily; Therapy: 53Hvw8009-(Last:01Dec2015) Ordered   26  Torsemide 20 MG Oral Tablet; Take 1 tablet twice daily; Therapy: (Recorded:29Jun2016) to Recorded   27  Vitamin B-12 TABS; 2,000 mcg daily; Therapy: (Recorded:19Jan2016) to Recorded   28  Vitamin D3 1000 UNIT Oral Tablet; TAKE 4 TABLET Daily; Last YS:32GRS1841 Ordered  Medication List Reviewed: The medication list was reviewed and updated today  Allergies  Medication    1  No Known Drug Allergies  Non-Medication    2  No Known Environmental Allergies   3  No Known Food Allergies    Vitals  Vital Signs [Data Includes: Current Encounter]    Recorded: 04IZX7389 11:40AM   Heart Rate 66, Apical   Pulse Quality Irregular, Apical   Systolic 389, RUE, Sitting   Diastolic 66, RUE, Sitting   Height 5 ft 9 in   Weight 188 lb 1 oz   BMI Calculated 27 77   BSA Calculated 2 01     Physical Exam    Constitutional   General appearance: Abnormal   chronically ill, overweight, rested and well hydrated  Eyes   Conjunctiva and Sclera examination: Conjunctiva pink, sclera anicteric  both conjunctiva were norml and pink  Neck   Neck and thyroid: Normal, supple, trachea midline, no thyromegaly  the trachea was midline  Jugular Veins: the JVP was not elevated and no sustained hepatojugular reflux  Pulmonary   Respiratory effort: Abnormal   Respiratory rate: tachypnea  Assessment of respiratory effort revealed no accessory muscle use and no supraclavicular retractions  Respiratory Findings: no audible wheezing and no stridor  Evaluation of respiratory movements showed no abdominal breathing     Auscultation of lungs: Abnormal   Auscultation of the lungs revealed decreased breath sounds diffusely and prolonged expiratory time  rhonchi over both bases  no wheezing  no bronchial breath sounds  Cardiovascular   Palpation of heart: Abnormal   The apical impulse was not palpable  Auscultation of heart: Abnormal   The heart rate was normal  The rhythm was irregularly irregular  Heart sounds: the heart sounds were distant, but normal S1 and normal S2  No pericardial rub  A grade 2 systolic murmur was heard at the apex  Loudest with long RR interval    Carotid pulses: Abnormal   right 2+, left 1+ and left carotid bruit heard  Pedal pulses: Abnormal   Posterior tibialis pulse was 1+ on the right and 1+ on the left  Dorsalis pedis pulse was 0 on the right and 0 on the left  Examination of extremities for edema and/or varicosities: Abnormal   bilateral ankle 2+ pitting edema and bilateral pretibial 1+ pitting edema  varicosities noted bilaterally  Chest - Chest: Normal    Abdomen   Abdomen: Non-tender and no distention  Musculoskeletal Digits and nails: Abnormal  Examination of the extremities revealed fingernail clubbing, but well perfused fingers  Skin - Skin and subcutaneous tissue: Normal without rashes or lesions  Skin is warm and well perfused, normal turgor  Skin Inspection: normal color and pigmentation, no cyanosis and no diaphoresis  Neurologic - Speech: Normal     Psychiatric - Orientation to person, place, and time: Normal  Mood and affect: Normal       Future Appointments    Date/Time Provider Specialty Site   09/01/2016 09:30 AM Ritu Allen DO Internal Medicine [facility]   07/14/2016 03:00 PM JASSON Fang   [facility]phrology [facility]   08/22/2016 09:45 AM Shelli West Endocrinology [facility]   07/19/2016 09:30 AM Carolyn Stevens [facility] [address]     Signatures   Electronically signed by : JASSON Byrne ; Jun 29 2016 12:03PM EST                       (Author)

## 2018-01-12 NOTE — MISCELLANEOUS
Assessment    1  Acute bronchitis (466 0) (J20 9)   2  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,440 9) (I25 10)   3  Atrial fibrillation (427 31) (I48 91)   4  Benign essential hypertension (401 1) (I10)   5  DMII (diabetes mellitus, type 2) (250 00) (E11 9)    Plan  DMII (diabetes mellitus, type 2)    · Follow-up visit in 2 weeks Evaluation and Treatment  Follow-up  Status: Hold For -  Scheduling  Requested for: 94EJW8889   Ordered; For: DMII (diabetes mellitus, type 2); Ordered By: Toño Tavarez Performed:  Due: 09KAC9212    Discussion/Summary  Discussion Summary:   #1  Acute bronchitis/exacerbation of COPD  As mentioned patient states he is still significantly short of breath and as mentioned his sputum has changed in color from a brown and dark sputum to a whitish sputum at this time  He is continuing with the oral steroid-induced in a tapering dose  I did suggest he make a follow-up appointment with his pulmonologist in the very near future  I did make a change in his medication and took him off the Spiriva and started him on Stiolto to use 2 puffs daily to see if this helps with his lung function and secretions  I did demonstrate the use of the inhaler to the patient and his wife and he was given written instructions on its use  Patient was told if increasing problems with shortness of breath in the interim to be seen in the emergency room as soon as possible  #2  History of atherosclerotic cardiovascular disease  It is felt that he was not having a cardiac event with this admission  Patient will continue to follow-up with his cardiologist     #3  Chronic atrial fibrillation  His heart rate is stable at this point in time  #4  Hypertension  Controlled with present treatment  #5  Diabetes mellitus type 2  Patient has made adjustments in his in's lung dose and understands that this derides will increase his blood sugar levels  Patient's fasting sugar this morning was 111     Counseling Documentation With Imm: The patient, patient's family was counseled regarding diagnostic results, instructions for management, risk factor reductions, prognosis, patient and family education, impressions, risks and benefits of treatment options  Medication SE Review and Pt Understands Tx: Possible side effects of new medications were reviewed with the patient/guardian today  Chief Complaint  Chief Complaint Free Text Note Form: patient is here for a CHRISTINA      History of Present Illness  TCM Communication [facility]: The patient is being contacted for follow-up after hospitalization and [facility]  He was hospitalized at [facility]  The date of admission: 5/3/2016, date of discharge: 5/5/2016  Diagnosis: COPD Exacerbation  He was discharged to home  He scheduled a follow up appointment  Symptoms: weakness, cough and shortness of breath, but no fever, no dizziness, no headache, no fatigue, no back pain on left side, no back pain on right side, no arm pain left side, no arm pain on right side, no leg pain on left side, no leg pain on right side, no upper abdominal pain, no middle abdominal pain, no lower abdominal pain, no anorexia, no nausea, no vomiting, no loose stools, no constipation, no pain with urinating, no incisional pain and no wound drainage  The patient is currently experiencing symptoms  Counseling was provided to the patient and patient's family  Left Message on 5/6/2016 1026a, Wife returned call  Topics counseled included diagnostic results, instructions for management, risk factor reductions, prognosis, patient and family education, home health agency benefits, activities of daily living and importance of compliance with treatment  Communication performed and completed by Silvio Schmitt   HPI: Patient is an 80-year-old male with a history of multiple medical problems including coronary artery disease, recurrent CHF, COPD   Patient was recently admitted for an exacerbation of his chronic obstructive pulmonary disease  Originally they thought he may have a pneumonia  He was placed on respiratory updraft treatments and also started on IV and by mouth steroid-induced along with IV antibiotically treatment  Medically the patient was stabilized and discharged home on Saturday  Patient states he does not feel he is appreciably improved since his hospital stay  He still continues with shortness of breath with exertion  He is not arrange for a follow-up appointment with his pulmonologist as of yet but will arrange for this in the very near future  He states the only improvement is that prior to his hospitalization he was coughing up brownish thick mucus which is now clear to whitish in color      Review of Systems  Complete-Male:   Constitutional: feeling poorly and feeling tired, but no fever, no recent weight gain, no chills and no recent weight loss  Eyes: No complaints of eye pain, no red eyes, no discharge from eyes, no itchy eyes  ENT: hoarseness, but no earache, no nosebleeds, no hearing loss and no nasal discharge  Cardiovascular: chest pain and lower extremity edema, but the heart rate was not slow, no intermittent leg claudication, the heart rate was not fast and no palpitations  Respiratory: shortness of breath, cough and shortness of breath during exertion, but no orthopnea and no wheezing  Gastrointestinal: constipation and History of chronic constipation, but no abdominal pain, no nausea, no vomiting, no diarrhea and no blood in stools  Genitourinary: urinary hesitancy and nocturia, but no dysuria, no genital lesions, no incontinence and no testicular pain  Musculoskeletal: arthralgias and joint stiffness, but no joint swelling, no limb pain, no myalgias and no limb swelling  Integumentary: skin wound and Sacral decubitus, but no rashes, no itching, no dry skin and no skin lesions     Neurological: No compliants of headache, no confusion, no convulsions, no numbness or tingling, no dizziness or fainting, no limb weakness, no difficulty walking  Psychiatric: Is not suicidal, no sleep disturbances, no anxiety or depression, no change in personality, no emotional problems  Endocrine: erectile dysfunction and feelings of weakness, but no proptosis, no muscle weakness, no deepening of the voice and no hot flashes  Hematologic/Lymphatic: a tendency for easy bleeding and a tendency for easy bruising, but no swollen glands and no swollen glands in the neck  ROS Reviewed:   ROS reviewed  Active Problems    1  Accidental fall (E888 9) (W19 XXXA)   2  Acute bronchitis (466 0) (J20 9)   3  Acute Non-Q-wave Myocardial Infarction (410 70)   4  Anemia (285 9) (D64 9)   5  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)   6  Angina pectoris (413 9) (I20 9)   7  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,440 9) (I25 10)   8  Asymptomatic carotid artery narrowing without infarction (433 10) (I65 29)   9  Atrial fibrillation (427 31) (I48 91)   10  Benign essential hypertension (401 1) (I10)   11  Black tarry stools (578 1) (K92 1)   12  CABG   13  Chest pain (786 50) (R07 9)   14  Chronic diastolic congestive heart failure (428 32,428 0) (I50 32)   15  Chronic kidney disease (585 9) (N18 9)   16  Chronic obstructive pulmonary disease (496) (J44 9)   17  Constipation, chronic (564 00) (K59 09)   18  Depression (311) (F32 9)   19  DMII (diabetes mellitus, type 2) (250 00) (E11 9)   20  Drug eruption (693 0) (L27 0)   21  Dysphagia (787 20) (R13 10)   22  Dyspnea (786 09) (R06 00)   23  Edema (782 3) (R60 9)   24  GERD (gastroesophageal reflux disease) (530 81) (K21 9)   25  Hyperkalemia (276 7) (E87 5)   26  Hyperlipidemia (272 4) (E78 5)   27  Hypoglycemia (251 2) (E16 2)   28  Microalbuminuria (791 0) (R80 9)   29  Neuropathy (355 9) (G62 9)   30  Non-healing open wound of right groin (879 5) (S31 103A)   31   Non-proliferative diabetic retinopathy (250 99,362 03) (E11 329)   32  Peripheral vascular disease (443 9) (I73 9)   33  Postoperative state (V45 89) (Z98 89)   34  Prostate disorder (602 9) (N42 9)   35  Puncture Wound Of Foot (892 0)   36  Sacral decubitus ulcer, stage II (389 25,265 02) (L89 152)   37  Sinusitis (473 9) (J32 9)   38  Vitamin D deficiency (268 9) (E55 9)    Past Medical History    1  History of Denial Of Any Significant Medical History   2  History of angina (V12 59) (Z86 79)   3  History of blood transfusion (V15 89) (Z92 89)   4  History of PTCA (V45 82) (Z98 61)   5  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)   6  History of Pulmonary edema (514) (J81 1)    Surgical History    1  History of CABG   2  CABG   3  History of Cataract Surgery   4  History of Cath Stent Placement   5  History of Hernia Repair   6  History of Surgery For Aneurysm   7  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    Family History  Mother    1  No pertinent family history  Spouse    2  Family history of cardiac disorder (V17 49) (Z82 49)  Family History    3  Family history of Cancer   4  Family history of Coronary Artery Disease (V17 49)   5  Family history of Diabetes Mellitus (V18 0)    Social History    · Caffeine use (V49 89) (F15 90)   · Former smoker (W61 04) (P06 103)   · Marital History - Currently    · No drug use   · Stopped Drinking Alcohol    Current Meds   1  Advair Diskus 250-50 MCG/DOSE Inhalation Aerosol Powder Breath Activated; INHALE 1   PUFF TWICE DAILY  RINSE MOUTH AFTER USE; Therapy: 49Nfa9142 to (Evaluate:80Huw6213) Recorded   2  Albuterol Sulfate (2 5 MG/3ML) 0 083% Inhalation Nebulization Solution; USE 1 UNIT   DOSE VIA NEBULIZER  4 TIMES A DAY AS NEEDED Recorded   3  AmLODIPine Besylate 2 5 MG Oral Tablet; Take 1 tablet daily; Therapy: 78LNJ6082 to (Evaluate:46Ejd3312)  Requested for: 26Dvc9216; Last   Rx:16Mkn2339 Ordered   4  Aspirin Low Dose 81 MG Oral Tablet; Take 1 tablet daily Recorded   5   Atorvastatin Calcium 40 MG Oral Tablet; TAKE 1 TABLET DAILY AT BEDTIME; Therapy: 33SRY3909 to (Evaluate:05Jan2017)  Requested for: 36ANY7309; Last   Rx:11Jan2016 Ordered   6  Centrum Silver Oral Tablet; Therapy: (Recorded:18Jur5469) to Recorded   7  Cinnamon TABS; take one tablet daily; Therapy: (Recorded:32Crr7046) to Recorded   8  Citalopram Hydrobromide 10 MG Oral Tablet; TAKE 1 TABLET AT BEDTIME; Therapy: 72AKD9116 to (Last Rx:00Emk9932)  Requested for: 86Rdt7526 Ordered   9  Clopidogrel Bisulfate 75 MG Oral Tablet; Take 1 tablet daily; Therapy: 64ZDA6657 to (Dorma Beaverton)  Requested for: 39DIX8846; Last   Rx:06Kdo4564 Ordered   10  Daliresp 500 MCG Oral Tablet; TAKE 1 TABLET DAILY; Therapy: (Recorded:92Mwl8550) to Recorded   11  FerrouSul 325 (65 Fe) MG Oral Tablet; TAKE 1 TABLET TWICE DAILY WITH MEALS; Therapy: 14MJO2565 to (Gudelia Honey)  Requested for: 68FUT7220; Last    Rx:57Jie3066 Ordered   12  Fish Oil 1200 MG Oral Capsule; Therapy: (Recorded:43Zce1015) to Recorded   13  Fluticasone Propionate 50 MCG/ACT Nasal Suspension; USE 1 SPRAY IN EACH    NOSTRIL TWICE DAILY; Therapy: 92WGP8511 to (Last Rx:17Mar2016)  Requested for: 52QFT8045 Ordered   14  Isosorbide Mononitrate ER 30 MG Oral Tablet Extended Release 24 Hour; TAKE 1    TABLET DAILY; Therapy: (Recorded:03Mar2016) to Recorded   15  Lantus SoloStar 100 UNIT/ML Subcutaneous Solution Pen-injector; imject 32 units twice    daily; Therapy: 67OFZ4412 to (Carmie Spindle)  Requested for: 22EAI3680; Last    Rx:07Mar2016 Ordered   16  Metoprolol Tartrate 50 MG Oral Tablet; take 1/2 tablet by mouth twice a dayl; Therapy: 28Mar2011-(Evaluate:09Apr2017)  Requested for: 18Rzr2132 Ordered   17  Nitrostat 0 4 MG Sublingual Tablet Sublingual; PLACE 1 TABLET UNDER THE TONGUE    EVERY 5 MINUTES FOR UP TO 3 DOSES AS NEEDED FOR CHEST PAIN  CALL    911 IF PAIN PERSISTS;     Therapy: 88RET0854 to (Shelley Simmons)  Requested for: 05Apr2016; Last Rx:05Apr2016 Ordered   18  NovoLOG 100 UNIT/ML Subcutaneous Solution; 12 units before breakfast and lunch, 16    before supper; Therapy: (Recorded:63Rzl6633) to Recorded   19  NovoLOG FlexPen 100 UNIT/ML Subcutaneous Solution Pen-injector; imject 10 units    before breakfast and lunch and 14 before dinner; Therapy: 34CZK2193 to (Evaluate:05Jun2016)  Requested for: 91PLV0022; Last    Rx:07Mar2016 Ordered   20  Pantoprazole Sodium 40 MG Oral Tablet Delayed Release; Take 1 tablet twice daily; Therapy: 24VCI9088 to (Last TN:29IQF2805)  Requested for: 23Oct2015 Ordered   21  Spiriva HandiHaler 18 MCG Inhalation Capsule; USE AS DIRECTED; Therapy: 60Puv9324 to Recorded   22  Tamsulosin HCl - 0 4 MG Oral Capsule; TAKE ONE CAPSULE BY MOUTH AT BEDTIME; Therapy: (Recorded:42Zlt1623) to Recorded   23  Terazosin HCl - 2 MG Oral Capsule; TAKE 2 CAPSULES AT BEDTIME; Therapy: 21ZDD2188 to (Jez Motta)  Requested for: 03LOL0206; Last    Rx:38Zzd5380 Ordered   24  Torsemide 20 MG Oral Tablet; Take 1 tablet daily; Therapy: 14Apr2016-(Last:26Yio9519) Ordered   25  Vitamin B-12 TABS; 2,000 mcg daily; Therapy: (Recorded:19Jan2016) to Recorded   26  Vitamin D3 1000 UNIT Oral Tablet; Therapy: (Recorded:41Uwd0765) to Recorded    Allergies    1  No Known Drug Allergies    2  No Known Environmental Allergies   3  No Known Food Allergies    Physical Exam    Constitutional   General appearance: Abnormal   Pleasant 49-year-old male who is awake alert  Eyes   Conjunctiva and lids: No swelling, erythema, or discharge  Pupils and irises: Equal, round and reactive to light  Ears, Nose, Mouth, and Throat   External inspection of ears and nose: Normal     Otoscopic examination: Tympanic membrance translucent with normal light reflex  Canals patent without erythema  Nasal mucosa, septum, and turbinates: Normal without edema or erythema  Oropharynx: Normal with no erythema, edema, exudate or lesions  Pulmonary   Respiratory effort: No increased work of breathing or signs of respiratory distress  Some mild chronic shortness of breath with exertion  Auscultation of lungs: Abnormal   Decreased breath sounds and air flow with rhonchi heard both anterior and posteriorly which are faint, he states these improve after respiratory treatments  Cardiovascular   Palpation of heart: Normal PMI, no thrills  Auscultation of heart: Normal rate and rhythm, normal S1 and S2, without murmurs  Examination of extremities for edema and/or varicosities: Normal     Carotid pulses: Abnormal   Bilateral carotid bruits  Abdomen   Abdomen: Abnormal   Obese soft nontender with positive decreased bowel sounds Ã4 quadrants  Liver and spleen: No hepatomegaly or splenomegaly  Lymphatic   Palpation of lymph nodes in neck: No lymphadenopathy  Musculoskeletal   Gait and station: Abnormal   Antalgic gait walking with a boot on left foot for nonhealing ulcer  Digits and nails: Abnormal   Diffuse arthritic changes hands and digits  Inspection/palpation of joints, bones, and muscles: Abnormal   Diffuse osteoarthritis, tenderness and ecchymosis but no deformity to the right chest wall in the mid axillary line to the area of T6-T10  Skin   Skin and subcutaneous tissue: Normal without rashes or lesions  Neurologic   Cranial nerves: Cranial nerves 2-12 intact  Reflexes: Abnormal   Absent patellar and Achilles deep tendon reflexes  Sensation: Abnormal     Psychiatric   Orientation to person, place and time: Normal     Mood and affect: Abnormal   Flat but pleasant mood today  Diabetic Foot Screen: Abnormal        Future Appointments    Date/Time Provider Specialty Site   07/19/2016 09:30 AM Joel Nava MD Neurology [facility]   05/19/2016 09:10 JASSON Shrestha   Endocrinology [facility] ENDOCRINOLOGY [facility]   06/14/2016 10:00 AM Avtar Fernandez DO Internal Medicine [facility] INTERNAL MED   06/16/2016 09:00 AM JASSON Mcginnis   Vascular Surgery [address] [facility] NEUROSURGICAL     Signatures   Electronically signed by : Tc Kerr OM; May  6 2016 12:35PM EST                       (Author)    Electronically signed by : Carter Jama DO; May  9 2016 12:29PM EST                       (Author)

## 2018-01-12 NOTE — MISCELLANEOUS
Assessment    1  Chronic combined systolic and diastolic CHF (congestive heart failure) (428 42,428 0)   (I50 42)   2  Chronic obstructive pulmonary disease (496) (J44 9)   3  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,440 9) (I25 10)   4  DMII (diabetes mellitus, type 2) (250 00) (E11 9)    Plan  CKD (chronic kidney disease), stage III    · (1) BASIC METABOLIC PROFILE; Status:Active; Requested for:10Oct2016;    Perform:[facility]; Due:10Oct2017;Ordered; For:CKD (chronic kidney disease), stage III; Ordered By:Hossein Fernandez;   · *1 - [facility] (NEPHROLOGY ) Physician Referral  Consult  Status:  Unauthorized - Requires Signature  Requested for: [id number]   Ordered; For: CKD (chronic kidney disease), stage III; Ordered By: Avelino Jennings Performed:  Due: [id number]; Last Updated By: Kushal Coleman; 10/10/2016 11:57:50 AM  Care Summary provided  : Yes    Discussion/Summary  Discussion Summary:   #1  Combined systolic, diastolic congestive heart failure with acute exacerbation  As per hospital discharge patient was vigorously diuresed and was returned to home when his kidney function had normalized and he became asymptomatic  Patient has follow-up with his cardiologist and nephrology for further evaluation  Patient's renal status continues to decline  #2  Chronic obstructive pulmonary disease  Patient will be finishing up a tapering dose of steroids today  Along with the steroid dose patient has had an increase in his blood sugars which hopefully of his corrected once the steroids are finished  Patient does have a follow-up appointment scheduled with his pulmonologist     #3  Atherosclerotic cardiovascular disease  Patient had no evidence of acute ischemia on recent admission  Patient continues to follow-up with cardiology  #4  Diabetes mellitus type 2   As mentioned patient's blood sugars have been elevated while he is been on oral prednisone for his chronic obstructive pulmonary disease  Patient was told to discuss this with his endocrinologist if his sugars are not coming down to a baseline previously  Counseling Documentation With Imm: The patient, patient's family was counseled regarding diagnostic results, instructions for management, risk factor reductions, prognosis, patient and family education, impressions, risks and benefits of treatment options, importance of compliance with treatment  Medication SE Review and Pt Understands Tx: Possible side effects of new medications were reviewed with the patient/guardian today  The treatment plan was reviewed with the patient/guardian  The patient/guardian understands and agrees with the treatment plan      Chief Complaint  Chief Complaint Free Text Note Form: Here after recent hospitalization      History of Present Illness  TCM Communication [facility]: The patient is being contacted for follow-up after hospitalization and [facility]  He was hospitalized at and [facility]  The date of admission: 10/2/2016, date of discharge: 10/4/2016  Diagnosis: CHF, acute and chronic kidney disease  He was discharged to home  Medications reviewed and updated today  He scheduled a follow up appointment  Symptoms: weakness, fatigue, shortness of breath, constipation and swelling, but no fever, no dizziness, no headache, no cough, no chest pain, no back pain on left side, no back pain on right side, no arm pain left side, no arm pain on right side, no leg pain on left side, no leg pain on right side, no upper abdominal pain, no middle abdominal pain, no lower abdominal pain, no rash:, no anorexia, no nausea, no vomiting, no loose stools, no pain with urinating, no incisional pain and no wound drainage  The patient is currently asymptomatic  Referrals Needed:  Patient was given appointment on 10/6 for a 10/10 appointment  Counseling was provided to the patient and patient's family   Topics counseled included diagnostic results, instructions for management, risk factor reductions, prognosis, patient and family education, home health agency benefits, activities of daily living and importance of compliance with treatment  Communication performed and completed by Sai Hensley   HPI: Patient is an 80-year-old male with a history of multiple medical problems as outlined previously  Patient was readmitted to the hospital again with an exacerbation of CHF complaining of severe shortness of breath  Patient underwent vigorous diuresis for his congestive heart failure  Patient also was noted to have acute kidney injury during his admission  Once patient's symptoms were resolved and renal status was stabilized patient was discharged home  Patient has had no significant CHF symptoms since discharge  He has chronic shortness of breath with exertion  He is on a tapering dose of steroids which will be stopped tomorrow  Patient metabolic profile which was performed last Friday shows a worsening of his kidney status with increased BUN and creatinine  Patient was to have an appointment today be seen by cardiology and he will be seen by his nephrologist in the very near future  Is no new complaints or concerns but I did tell him and his wife today worried about his kidney status especially with continued aggressive use of diuretics for his exacerbations of CHF  Review of Systems  Complete-Male:   Constitutional: feeling tired, but no fever, not feeling poorly, no recent weight gain, no chills and no recent weight loss  Eyes: eyesight problems, but no eye pain, no dryness of the eyes, eyes not red, no purulent discharge from the eyes and no itching of the eyes  ENT: hoarseness, but no earache, no nosebleeds, no hearing loss and no nasal discharge  Cardiovascular: lower extremity edema, but the heart rate was not slow, no chest pain, no intermittent leg claudication, the heart rate was not fast and no palpitations     Respiratory: shortness of breath and shortness of breath during exertion, but no cough, no orthopnea and no wheezing  Gastrointestinal: constipation and History of chronic constipation, but no abdominal pain, no nausea, no vomiting, no diarrhea and no blood in stools  Genitourinary: urinary hesitancy and nocturia, but no dysuria, no genital lesions, no incontinence and no testicular pain  Musculoskeletal: arthralgias and joint stiffness, but no joint swelling, no limb pain, no myalgias and no limb swelling  Integumentary: skin wound and Sacral decubitus, but no rashes, no itching, no dry skin and no skin lesions  Neurological: No compliants of headache, no confusion, no convulsions, no numbness or tingling, no dizziness or fainting, no limb weakness, no difficulty walking  Psychiatric: Is not suicidal, no sleep disturbances, no anxiety or depression, no change in personality, no emotional problems  Endocrine: erectile dysfunction and feelings of weakness, but no proptosis, no muscle weakness, no deepening of the voice and no hot flashes  Hematologic/Lymphatic: a tendency for easy bleeding and a tendency for easy bruising, but no swollen glands and no swollen glands in the neck  ROS Reviewed:   ROS reviewed  Active Problems     1  Abnormal weight gain (783 1) (R63 5)   2  Accidental fall (E888 9) (W19 XXXA)   3  Acute bronchitis (466 0) (J20 9)   4  Acute Non-Q-wave Myocardial Infarction (410 70)   5  Ambulatory dysfunction (719 7) (R26 2)   6  Anemia (285 9) (D64 9)   7  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)   8  Angina pectoris (413 9) (I20 9)   9  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,440 9) (I25 10)   10  Asymptomatic carotid artery narrowing without infarction (433 10) (I65 29)   11  Benign essential hypertension (401 1) (I10)   12  Bilateral leg edema (782 3) (R60 0)   13  Black tarry stools (578 1) (K92 1)   14  CABG   15  Cellulitis (On Exam)   16  Chest pain (786 50) (R07 9)   17  Chronic diastolic congestive heart failure (428 32,428 0) (I50 32)   18  Chronic kidney disease (585 9) (N18 9)   19  Chronic obstructive pulmonary disease (496) (J44 9)   20  CKD (chronic kidney disease), stage III (585 3) (N18 3)   21  Constipation, chronic (564 00) (K59 09)   22  Depression (311) (F32 9)   23  Diabetic neuropathy, type II diabetes mellitus (250 60,357 2) (E11 40)   24  DMII (diabetes mellitus, type 2) (250 00) (E11 9)   25  Drug eruption (693 0) (L27 0)   26  Dysphagia (787 20) (R13 10)   27  Dyspnea (786 09) (R06 00)   28  Edema (782 3) (R60 9)   29  GERD (gastroesophageal reflux disease) (530 81) (K21 9)   30  History of CVA (cerebrovascular accident) (V12 54) (Z86 73)   31  Hyperkalemia (276 7) (E87 5)   32  Hyperlipidemia (272 4) (E78 5)   33  Hypoglycemia (251 2) (E16 2)   34  Microalbuminuria (791 0) (R80 9)   35  Need for influenza vaccination (V04 81) (Z23)   36  Neuropathy (355 9) (G62 9)   37  Non-healing open wound of right groin (879 5) (S31 103A)   38  Non-proliferative diabetic retinopathy (250 50,362 03) (E11 3299)   39  Peripheral vascular disease (443 9) (I73 9)   40  Postoperative state (V45 89) (Z98 890)   41  Prostate disorder (602 9) (N42 9)   42  Puncture Wound Of Foot (892 0)   43  Sacral decubitus ulcer, stage II (420 99,185 35) (L89 152)   44  Secondary hyperparathyroidism (588 81) (N25 81)   45  Sinusitis (473 9) (J32 9)   46  Skin ulcer, chronic (707 9) (L98 499)   47  Type 2 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 (250 80,707 15) (E11 621,L97 529)   48  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)   49  Vitamin D deficiency (268 9) (E55 9)    Atrial fibrillation (427 31) (I48 91)          Past Medical History    1  History of Denial Of Any Significant Medical History   2  History of angina (V12 59) (Z86 79)   3  History of blood transfusion (V15 89) (Z92 89)   4  History of PTCA (V45 82) (Z98 61)   5   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)   6  History of Pulmonary edema (514) (J81 1)    Surgical History    1  History of CABG   2  CABG   3  History of Cataract Surgery   4  History of Cath Stent Placement   5  History of Hernia Repair   6  History of Surgery For Aneurysm   7  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    Family History  Mother    1  Family history of Diabetes Mellitus (V18 0)  Sister    2  Family history of malignant neoplasm (V16 9) (Z80 9)  Spouse    3  Family history of cardiac disorder (V17 49) (Z82 49)  Family History    4  Family history of Cancer   5  Family history of Coronary Artery Disease (V17 49)   6  Family history of Diabetes Mellitus (V18 0)    Social History    · Denied: History of Always uses seat belt   · Caffeine use (V49 89) (F15 90)   · Daily caffeine consumption, 1 serving a day   · Does not exercise (V69 0) (Z72 3)   · Former smoker (C04 42) (Z87 891)   · Marital History - Currently    · No drug use   · Stopped Drinking Alcohol    Current Meds   1  Advair Diskus 250-50 MCG/DOSE Inhalation Aerosol Powder Breath Activated; INHALE 1   PUFF TWICE DAILY  RINSE MOUTH AFTER USE; Therapy: 52Prv1642 to (Evaluate:11Txr3519) Recorded   2  Albuterol Sulfate (2 5 MG/3ML) 0 083% Inhalation Nebulization Solution; USE 1 UNIT   DOSE VIA NEBULIZER  4 TIMES A DAY AS NEEDED Recorded   3  AmLODIPine Besylate 2 5 MG Oral Tablet; Take 1 tablet daily; Therapy: 76BWP8276 to (Evaluate:11Sep2017)  Requested for: 42Xhc4111; Last   Rx:46Qwh8456 Ordered   4  AmLODIPine Besylate 2 5 MG Oral Tablet; TAKE 1 TABLET DAILY; Therapy: 26RIN4593 to (Evaluate:06Jun2017); Last Rx:05Cne4071 Ordered   5  Aspirin Low Dose 81 MG TABS; Take 1 tablet daily Recorded   6  Atorvastatin Calcium 40 MG Oral Tablet; TAKE 1 TABLET DAILY AT BEDTIME; Therapy: 03CSF3620 to (Evaluate:05Jan2017)  Requested for: 02KNE1755; Last   Rx:11Jan2016 Ordered   7  BD Pen Needle Mae U/F 32G X 4 MM Miscellaneous; four times daily;    Therapy: 60ZNS6112 to (Evaluate:13Nov2016)  Requested for: 79Iqn9865; Last   Rx:20Xfc4903 Ordered   8  Centrum Silver Oral Tablet; TAKE 1 TABLET DAILY; Therapy: (Recorded:32Pfv3202) to Recorded   9  Cinnamon 500 MG Oral Tablet; 4 tablets daily; Therapy: (Robert Castellano) to Recorded   10  Citalopram Hydrobromide 10 MG Oral Tablet; TAKE 1 TABLET AT BEDTIME; Therapy: 10HXV0578 to (Last VB:46ZLG5271)  Requested for: 23BKU6373 Ordered   11  Clopidogrel Bisulfate 75 MG Oral Tablet; Take 1 tablet daily; Therapy: 61RAL3420 to (Alonzo Castillo)  Requested for: 29AHR2996; Last    Rx:35Fnk7732 Ordered   12  Daliresp 500 MCG Oral Tablet; TAKE 1 TABLET DAILY; Therapy: (Recorded:88Swo9422) to Recorded   13  DrRx Keflex 500 MG #30; One by mouth twice a day to finish; Therapy: 15NPU2902 to (Last Rx:62Rev7521) Ordered   14  FerrouSul 325 (65 Fe) MG Oral Tablet; TAKE 1 TABLET DAILY; Therapy: (Robert Castellano) to Recorded   15  Fish Oil 1200 MG Oral Capsule; three times daily; Therapy: (Robert Castellano) to Recorded   16  Isosorbide Mononitrate ER 30 MG Oral Tablet Extended Release 24 Hour; TAKE 1    TABLET DAILY; Therapy: (Recorded:03Mar2016) to Recorded   17  Lantus SoloStar 100 UNIT/ML Subcutaneous Solution Pen-injector; Inject 35 units at    bedtime as directed by doctor; Therapy: (Recorded:05Ejt1970) to Recorded   18  Metoprolol Tartrate 50 MG Oral Tablet; take 1/2 tablet by mouth twice a dayl; Therapy: 28Mar2011-(Evaluate:09Apr2017)  Requested for: 14Apr2016 Ordered   19  Metoprolol Tartrate 50 MG Oral Tablet; take 1/2 tablet twice daily; Therapy: 65LLU1983 to (Evaluate:06Jun2017); Last Rx:54Zxq1176 Ordered   20  Multi-betic Diabetes Oral Tablet; TAKE 1 TABLET DAILY; Therapy: (Robert Castellano) to Recorded   21  NovoLOG FlexPen 100 UNIT/ML Subcutaneous Solution Pen-injector; Inject 12 units @    breakfastk, 12u at lunch and 14u at supper as directed by your    doctor;     Therapy: (Recorded:20Nzq4524) to Recorded   22  Pantoprazole Sodium 40 MG Oral Tablet Delayed Release; Take 1 tablet twice daily; Therapy: 84ARW9988 to (Evaluate:74Spq7062)  Requested for: 24Hsx3189; Last    Rx:74Tlo4207 Ordered   23  Spiriva HandiHaler 18 MCG Inhalation Capsule; USE AS DIRECTED; Therapy: 15Fmo3073 to Recorded   24  Tamsulosin HCl - 0 4 MG Oral Capsule; TAKE ONE CAPSULE BY MOUTH AT BEDTIME; Therapy: (Recorded:26Cyf4218) to Recorded   25  Terazosin HCl - 2 MG Oral Capsule; TAKE 2 CAPSULES AT BEDTIME; Therapy: 33YGC9588 to (Evaluate:57Tbq6184)  Requested for: 06Qsy9585; Last    Rx:65Zow8930 Ordered   26  Torsemide 20 MG Oral Tablet; Take 1 tablet daily  Requested for: 04UVN9951; Last    Rx:31Hzi7643 Ordered   27  Vitamin B-12 TABS; 2,000 mcg daily; Therapy: (Recorded:37Imi9920) to Recorded   28  Vitamin D3 2000 UNIT Oral Capsule; 2 daily equals 4000 iu daily; Therapy: (Recorded:35Sgt3152) to Recorded    Allergies    1  No Known Drug Allergies    2  No Known Environmental Allergies   3  No Known Food Allergies    Vitals  Signs   Recorded: 03YUB8235 07:41BF   Systolic: 270  Diastolic: 52  Heart Rate: 63  Temperature: 97 5 F  O2 Saturation: 98  Height: 5 ft 9 in  Weight: 180 lb 6 08 oz  BMI Calculated: 26 64  BSA Calculated: 1 97    Physical Exam    Constitutional   General appearance: Abnormal   Pleasant 80-year-old male who is awake alert  Eyes   Conjunctiva and lids: No swelling, erythema, or discharge  Pupils and irises: Equal, round and reactive to light  Ears, Nose, Mouth, and Throat   External inspection of ears and nose: Normal     Otoscopic examination: Tympanic membrance translucent with normal light reflex  Canals patent without erythema  Nasal mucosa, septum, and turbinates: Normal without edema or erythema  Oropharynx: Abnormal   Slightly erythematous posterior airway with a whitish postnasal drip     Pulmonary   Respiratory effort: No increased work of breathing or signs of respiratory distress  Some mild chronic shortness of breath with exertion  Auscultation of lungs: Abnormal   Decreased breath sounds and air flow with rhonchi heard both anterior and posteriorly which are faint, he states these improve after respiratory treatments  Cardiovascular   Palpation of heart: Normal PMI, no thrills  Auscultation of heart: Normal rate and rhythm, normal S1 and S2, without murmurs  Examination of extremities for edema and/or varicosities: Abnormal   Trace edema bilateral lower extremities which is improved since his last visit  Carotid pulses: Abnormal   Bilateral carotid bruits  Abdomen   Abdomen: Abnormal   Obese soft nontender with positive decreased bowel sounds Ã4 quadrants  Liver and spleen: No hepatomegaly or splenomegaly  Lymphatic   Palpation of lymph nodes in neck: No lymphadenopathy  Musculoskeletal   Gait and station: Abnormal   Antalgic gait walking with a cane  Digits and nails: Abnormal   Diffuse arthritic changes hands and digits  Inspection/palpation of joints, bones, and muscles: Abnormal   Diffuse osteoarthritis, tenderness and ecchymosis but no deformity to the right chest wall in the mid axillary line to the area of T6-T10  Skin   Skin and subcutaneous tissue: Normal without rashes or lesions  Examination of the skin for lesions: Abnormal   Some ecchymotic lesions to both hands which most likely was secondary to IV sites from recent hospitalization  Neurologic   Cranial nerves: Cranial nerves 2-12 intact  Reflexes: Abnormal   Absent patellar and Achilles deep tendon reflexes  Sensation: Abnormal     Psychiatric   Orientation to person, place and time: Normal     Mood and affect: Abnormal   Flat but pleasant mood today     Diabetic Foot Screen: Abnormal        Future Appointments    Date/Time Provider Specialty Site   01/27/2017 09:30 AM Dorene Molina MD Neurology [place] [address]   11/28/2016 08:30 AM JASSON Guajardo  Endocrinology [facility]   10/25/2016 03:00 PM Etienne Spencer DO Internal Medicine [facility] INTERNAL MED   12/01/2016 09:00 AM Etienne Spencer DO Internal Medicine [facility] INTERNAL MED   11/04/2016 11:30 AM JASSON Hernandez  Nephrology [address]   10/24/2016 10:00 AM Marie Handley, [address] [place] [address] Cardiology [facility]   11/15/2016 03:00 PM JASSON Hodges   Cardiology [facility]     Signatures   Electronically signed by : Manda Maharaj OM; Oct 10 2016  9:01AM EST                       (Author)    Electronically signed by : Vonzell Landau, DO; Oct 10 2016 12:35PM EST                       (Author)

## 2018-01-12 NOTE — MISCELLANEOUS
History of Present Illness  A call was received from the [facility] Nurse  The contact name and phone number is  Jose Francisco Flaherty  Status Check: today's weight is 185  Patient is experiencing the following symptoms: edema and shortness of breath with usual activity  Concerns expressed today consisted of: 4 # weight, increased edema, MILLER and new crackles to lung bases  Admits to dietary indiscretions over 4th of July  HFCC Additional Notes: Discussed with Cyndi Rothman, HF NP and patient to increase his torsemide to 40 mg bid X 2 days then reduce back to 20 mg bid  Get BMP 1 week  This relayed to Jose Francisco Flaherty  Plan  Chronic diastolic congestive heart failure    · Torsemide 20 MG Oral Tablet   · Torsemide 20 MG Oral Tablet; Take 1 tablet twice daily    Future Appointments    Date/Time Provider Specialty Site   09/01/2016 09:30 AM Temo Garcia DO Internal Medicine [facility] INTERNAL MED   07/14/2016 03:00 PM JASSON Arevalo   Nephrology [facility]   08/22/2016 09:45 AM Adri Nieto Endocrinology [facility] ENDOCRINOLOGY [facility]   07/19/2016 09:30 AM [facility] Neurology [address]     Signatures   Electronically signed by : Claudia Byrd, ; Jul 5 2016  2:37PM EST                       (Author)

## 2018-01-13 NOTE — PROGRESS NOTES
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)   5  Diabetic neuropathy, type II diabetes mellitus (250 60,357 2) (E11 40)    Plan  Diabetic neuropathy, type II diabetes mellitus    · Silver Alginate (Maxsorb AG, Silvercell, Aquacel Silver) instructions given;  Status:Complete;   Done: 18VAD5708   Ordered; For:Diabetic neuropathy, type II diabetes mellitus; Ordered By:Marimar Syed;   · Wound care as instructed ; Status:Complete;   Done: 86MZS7486   Ordered; For:Diabetic neuropathy, type II diabetes mellitus; Ordered By:Marimar Syed;   · Follow-up visit in 1 week Evaluation and Treatment  Follow-up  Status: Complete  Done:  79DQV9252   Ordered; For: Diabetic neuropathy, type II diabetes mellitus; Ordered By: Cherylene Cleaver Performed:  Due: 10ZNZ2972; Last Updated By: Elijah Castro; 2016 1:04:23 PM  Type 2 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    · * XR FOOT 3+ VIEW LEFT; Status:Active; Requested QLP:[id number];    Perform:[facility]; [id number];OTNPWMV; For:Type 2 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; Ordered By:Tim Syed; Wound Care Orders/Instructions    Wound Identification and Instructions   Wound #2: left great plantar toe    Wound Care Instructions  Discussed with Patient/Caregiver  Dressing Type: Silver Alginate (Maxsorb AG, Silvercell, Aquacel Silver)  Wash with mild soap and water, normal saline, wound cleanser  Apply 4% Topical Lidocaine anesthetic solution PRN to wound/ulcer prior to debridement for pain control    Secondary dressing apply: Gauze, 4x4  Secure with: Tape  Dressing change frequency: Every other day, Surgilast  Comments/Other:   Patient is instructed may shower, no bath    instructed to elevate legs    wear wedge shoe to offload pressure on left foot  Tubigrip applied to bilateral lower extremities   Apply compression using: Tubigrip E  Wound Goals  Wound Goals:   Healing Goals:   Fair healing potential, expect slow healing progress secondary to co-morbid conditions and advanced age  Wound base will be 25%    Patient will achieve full wound closure with ability to wear appropriate shoewear       Chief Complaint  Initial visit at [address] [place] [address] for left great plantar toe  The patient is established to [facility]      History of Present Illness    Wound Identification HPI   Wound #2: left great plantar toe      The patient came to Wound Care and was ambulatory with device, cane  The patient is being seen for an initial evaluation/start of care at the [address] [place] [address]  The patient is accompanied by his spouse  The patient's identification was verified  A secondary verification process was completed  Orientation: oriented to person, oriented to place and oriented to time  Blood Glucose:  160 mg/dL as reported by patient  6/9/2016: Patient was referred by Dr Henri Pitts PCP  Patient reports he was walking barefoot in his garage and stepped on something  [facility] was given a Tetanus shot and given antibiotic  Patient was instructed to follow up in 4 days later with Dr Pablo Cheung  Patient reported toe got infected and they found metal in the toes  Patient reports his podiatrist is now Dr Chris Porter  Current wound care Neosporin  Patient has a raised area to medial lower extremity  Patient has [facility] currently in the home         History of Falling: No = 0   Secondary Diagnosis: Yes = 15   Ambulatory Aid Crutches, Cane, Walker = 15   No = 0   Gait: Normal = 0   Mental Status: Oriented to own ability = 0   Total Score: 30    25 - 45 = Moderate Risk      Michael Scale:   Sensory Perception: Very limited = 2   Moisture: Rarely moist = 4   Activity: Walks occasionally = 3 Mobility: Slightly limited = 3   Nutrition: Probably inadequate = 2   Friction and Shear: No apparent problem = 3   Total Michael Score: 17   The most recent fall occurred denies any fall history  Nutrition Assessment Screening: Food intake over the last 3 months due to the loss of appetite, digestive problems, chewing or swallowing difficulties is graded as: 1 = moderate decrease in food intake   Weight loss during the last 3 months: 1 = does not know   Mobility scored as: 2 = goes out  Psychological Stress and Acute Disease Scored as: 2 = The patient has not experienced psychological stress or acute disease in the last 3 months  Neuropsychological problems scored as: 2 = no psychological problems  Body Mass Index (BMI) scored as: 3 = BMI 23 or greater  Nutritional Assessment Screening Score: 8 - 11 points - At risk of malnutrition  Provider Wound Care HPI: Nahed Ruiz is an 80-year-old male who presents to the wound management Center at [facility] for initial evaluation 6-9-16  Patient relates that over year ago  He believes he stepped on a piece of metal under his left great toe and has been treated since then  Patient relates that he first saw Dr Dirk Mcneill and then Dr Ramin Coreas  Patient states that he has been applying Neosporin and a Band-Aid for one year  Patient presents today in leather loafers  patient relates she is not wearing his diabetic shoes because his feet swollen to fit in them  Pain Assessment   the patient states they do not have pain  (on a scale of 0 to 10, the patient rates the pain at 0 )   Abuse And Domestic Violence Screen   Domestic violence screen not done today  Reason DV Screen not done: spouse present    Depression And Suicide Screen  Suicide screen not done today  Reason suicide screen not done: spouse present  Prefered Language is  [place]  Primary Language is  English  Readiness To Learn: Receptive  Barriers To Learning: none     Preferred Learning: demonstration and written   Education Completed: disease/condition, medications, further treatment/follow-up and treatment/procedure   Teaching Method: verbal   Person Taught: patient and spouse   Evaluation Of Learning: verbalized/demonstrated understanding and needs reinforcement      Review of Systems    Constitutional: recent weight gain, but no fever or chills, feels well, no tiredness, no recent weight loss or weight gain  Cardiovascular: palpitations and Afib  Respiratory: no complaints of shortness of breath, no wheezing or cough, no dyspnea on exertion, no orthopnea or PND  Genitourinary: no complaints of dysuria or incontinence, no hesitancy, no nocturia, no genital lesion, no inadequacy of penile erection  Musculoskeletal: limb pain and limb swelling  Integumentary: skin wound and left hallux  Neurological: diabetic neuropathy  ROS reviewed  Active Problems    1  Abnormal weight gain (783 1) (R63 5)   2  Accidental fall (E888 9) (W19 XXXA)   3  Acute bronchitis (466 0) (J20 9)   4  Acute Non-Q-wave Myocardial Infarction (410 70)   5  Anemia (285 9) (D64 9)   6  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)   7  Angina pectoris (413 9) (I20 9)   8  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,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 10)   9  Asymptomatic carotid artery narrowing without infarction (433 10) (I65 29)   10  Atrial fibrillation (427 31) (I48 91)   11  Benign essential hypertension (401 1) (I10)   12  Black tarry stools (578 1) (K92 1)   13  CABG   14  Cellulitis (On Exam)   15  Chest pain (786 50) (R07 9)   16  Chronic diastolic congestive heart failure (428 32,428 0) (I50 32)   17  Chronic kidney disease (585 9) (N18 9)   18  Chronic obstructive pulmonary disease (496) (J44 9)   19  Constipation, chronic (564 00) (K59 09)   20  Depression (311) (F32 9)   21  DMII (diabetes mellitus, type 2) (250 00) (E11 9)   22  Drug eruption (693 0) (L27 0)   23  Dysphagia (787 20) (R13 10)   24  Dyspnea (786 09) (R06 00)   25  Edema (782 3) (R60 9)   26  GERD (gastroesophageal reflux disease) (530 81) (K21 9)   27  Hyperkalemia (276 7) (E87 5)   28  Hyperlipidemia (272 4) (E78 5)   29  Hypoglycemia (251 2) (E16 2)   30  Microalbuminuria (791 0) (R80 9)   31  Neuropathy (355 9) (G62 9)   32  Non-healing open wound of right groin (879 5) (S31 103A)   33  Non-proliferative diabetic retinopathy (250 50,362 03) (E11 329)   34  Peripheral vascular disease (443 9) (I73 9)   35  Postoperative state (V45 89) (Z98 89)   36  Prostate disorder (602 9) (N42 9)   37  Puncture Wound Of Foot (892 0)   38  Sacral decubitus ulcer, stage II (056 58,003 41) (L89 152)   39  Secondary hyperparathyroidism (588 81) (N25 81)   40  Sinusitis (473 9) (J32 9)   41  Skin ulcer, chronic (707 9) (L98 499)   42  Vitamin D deficiency (268 9) (E55 9)    Past Medical History    1  History of Denial Of Any Significant Medical History   2  History of angina (V12 59) (Z86 79)   3  History of blood transfusion (V15 89) (Z92 89)   4  History of PTCA (V45 82) (Z98 61)   5  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)   6  History of Pulmonary edema (514) (J81 1)    The active problems and past medical history were reviewed and updated today  Surgical History    1  History of CABG   2  CABG   3  History of Cataract Surgery   4  History of Cath Stent Placement   5  History of Hernia Repair   6  History of Surgery For Aneurysm   7  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    The surgical history was reviewed and updated today  Family History    1  No pertinent family history    2  Family history of cardiac disorder (V17 49) (Z82 49)    3  Family history of Cancer   4  Family history of Coronary Artery Disease (V17 49)   5  Family history of Diabetes Mellitus (V18 0)    The family history was reviewed and updated today         Social History    · Caffeine use (V49 89) (F15 90)   · Former smoker (W82 65) (A34 219)   · Marital History - Currently    · No drug use   · Stopped Drinking Alcohol  The social history was reviewed and updated today  Current Meds   1  Advair Diskus 250-50 MCG/DOSE Inhalation Aerosol Powder Breath Activated; INHALE 1   PUFF TWICE DAILY  RINSE MOUTH AFTER USE; Therapy: 58Dpp9204 to (Evaluate:89Nao7937) Recorded   2  Albuterol Sulfate (2 5 MG/3ML) 0 083% Inhalation Nebulization Solution; USE 1 UNIT   DOSE VIA NEBULIZER  4 TIMES A DAY AS NEEDED Recorded   3  AmLODIPine Besylate 2 5 MG Oral Tablet; Take 1 tablet daily; Therapy: 72CVD1708 to (Evaluate:66Lig0082)  Requested for: 79Ojh8754; Last   Rx:98Ini8146 Ordered   4  Aspirin Low Dose 81 MG TABS; Take 1 tablet daily Recorded   5  Atorvastatin Calcium 40 MG Oral Tablet; TAKE 1 TABLET DAILY AT BEDTIME; Therapy: 50PSF5802 to (Evaluate:05Jan2017)  Requested for: 69TLE2053; Last   Rx:11Jan2016 Ordered   6  Centrum Silver Oral Tablet; Therapy: (Recorded:30Oct2014) to Recorded   7  Cinnamon TABS; take one tablet daily; Therapy: (Recorded:17Cah2856) to Recorded   8  Citalopram Hydrobromide 10 MG Oral Tablet; TAKE 1 TABLET AT BEDTIME; Therapy: 56HFO4730 to (Last PB:11WID6466)  Requested for: 71LFP9045 Ordered   9  Citalopram Hydrobromide 10 MG Oral Tablet; TAKE 1 TABLET AT BEDTIME; Therapy: 06ZEK4259 to (Last Rx:84Cst0224)  Requested for: 09XRV6757 Ordered   10  Clopidogrel Bisulfate 75 MG Oral Tablet; Take 1 tablet daily; Therapy: 48WRJ6358 to (67 488 45 07)  Requested for: 66XXQ6794; Last    Rx:67Jvw9999 Ordered   11  Daliresp 500 MCG Oral Tablet; TAKE 1 TABLET DAILY; Therapy: (Recorded:76Wln5197) to Recorded   12  FerrouSul 325 (65 Fe) MG Oral Tablet; TAKE 1 TABLET TWICE DAILY WITH MEALS; Therapy: 26VYF8489 to (Silverio Jacome)  Requested for: 64LPX0123; Last    Rx:58Yut7919 Ordered   13  Fish Oil 1200 MG Oral Capsule; Therapy: (Recorded:22Krp9630) to Recorded   14   Isosorbide Mononitrate ER 30 MG Oral Tablet Extended Release 24 Hour; TAKE 1    TABLET DAILY; Therapy: (Recorded:03Mar2016) to Recorded   15  Lantus SoloStar 100 UNIT/ML Subcutaneous Solution Pen-injector; Inject 35 units at    bedtime as directed by doctor; Therapy: (Recorded:20May2016) to Recorded   16  Lidocaine HCl (Local Anesth ) 4 % SOLN; Apply prn to wound(s) prior to debridment for    pain control; Therapy: (Recorded:09Jun2016) to Recorded   17  Metoprolol Tartrate 50 MG Oral Tablet; take 1/2 tablet by mouth twice a dayl; Therapy: 28Mar2011-(Evaluate:09Apr2017)  Requested for: 66Ekw1155 Ordered   18  Multi-betic Diabetes Oral Tablet; Therapy: (Recorded:20May2016) to Recorded   19  Nitrostat 0 4 MG Sublingual Tablet Sublingual; PLACE 1 TABLET UNDER THE TONGUE    EVERY 5 MINUTES FOR UP TO 3 DOSES AS NEEDED FOR CHEST PAIN  CALL    911 IF PAIN PERSISTS; Therapy: 29DZN0622 to (Jalen Betts)  Requested for: 05Apr2016; Last    Rx:05Apr2016 Ordered   20  NovoLOG FlexPen 100 UNIT/ML Subcutaneous Solution Pen-injector; imject 14 units    before breakfast and lunch and 14 before dinner; Therapy: 60GPF7657 to (Madison Wright)  Requested for: 26NPZ8401; Last    Rx:24May2016 Ordered   21  NovoLOG FlexPen 100 UNIT/ML Subcutaneous Solution Pen-injector; Inject 12 units @    breakfastk, 12u at lunch and 14u at supper as directed by your    doctor; Therapy: (Recorded:20May2016) to Recorded   22  Pantoprazole Sodium 40 MG Oral Tablet Delayed Release; Take 1 tablet twice daily; Therapy: 03ZDJ6633 to (Evaluate:27Usf1535)  Requested for: 92AQK0810; Last    Rx:24May2016 Ordered   23  Spiriva HandiHaler 18 MCG Inhalation Capsule; USE AS DIRECTED; Therapy: 84Ljf2639 to Recorded   24  Tamsulosin HCl - 0 4 MG Oral Capsule; TAKE ONE CAPSULE BY MOUTH AT BEDTIME; Therapy: (Recorded:45Gmb6670) to Recorded   25  Terazosin HCl - 2 MG Oral Capsule; TAKE 2 CAPSULES AT BEDTIME; Therapy: 55GFO9558 to (Catherine Cortez)  Requested for: 83VQB3664;  Last Rx: 71MES3514 Ordered   26  Torsemide 20 MG Oral Tablet; Take 1 tablet daily; Therapy: 66Gip7590-(Last:01Dec2015) Ordered   27  Vitamin B-12 TABS; 2,000 mcg daily; Therapy: (Recorded:19Jan2016) to Recorded   28  Vitamin D3 1000 UNIT Oral Tablet; TAKE 4 TABLET Daily; Last MB:43WFK6613 Ordered    The medication list was reviewed and updated today  Allergies    1  No Known Drug Allergies    2  No Known Environmental Allergies   3  No Known Food Allergies    Vitals  Vital Signs [Data Includes: Current Encounter]    Recorded: 16OOD8923 11:25AM   Temperature 98 4 F, Tympanic   Heart Rate 82, R Radial   Pulse Quality Regular, R Radial   Respiration 16   Respiration Quality Normal   Systolic 270, RUE, Sitting   Diastolic 66, RUE, Sitting   Height 5 ft 10 in   Weight 196 lb 0 6 oz   BMI Calculated 28 13   BSA Calculated 2 07   Pain Scale 0     Physical Exam    Pulse Exam   Posterior tibialis: right 1+ and left 1+  Dorsalis pedis: right 2+ and left 2+  Normal Capillary Refill  Extremities: bilateral ankle 2+ pitting edema, bilateral pretibial 2+ pitting edema and bilateral foot 2+ pitting edema  Lower Extremity Exam   Right Lower Extremity: Lower leg compartments are soft and without signs or symptoms for compartment syndrome  Left Lower Extremity: Lower leg compartments are soft and without signs or symptoms for compartment syndrome  Wound #2 Assessment wound #2 Location:, left great plantar toe, started on 11/2015, Care for this wound started on 6/9/2016  Wound Status: not healed     Length: 1 2cm x Width: 1cm x Depth: 0 2cm   Total: 1 2sq cm   Wound Volume: 0 24cm3           Tissue type: Subcutaneous, Granulation and Slough   Color of Wound: Red - 90% and Yellow - 10%   Exudate Amount: Scant   Exudate Type: Serosangiunous   Odor: None   Exudate Color: Yellow   Wound Edges: Callous   Periwound Skin Condition: Callus           Physician/Provider Wound #2 Exam   I agree with the nursing assessment and documentation  Steve Couch 1: Wound Nursing Care Plan   Impaired Tissue Integrity related to: left great plantar toe   Knowledge Deficit Related To: wound   Risk for Infection related to open wound: wound   Goals   Patient will achieve 100% epithelialization:  Patient will demonstrate and verbalize knowledge of their disease process and management:  Patient will verbalize signs and symptoms of infection and when to notify physician or [facility]:    Patient will demonstrate and verbalize infection control and preventative measures:  Wound Nursing Care Interventions:   Provide moist wound healing:  Teach and evaluate effectiveness of offloading measures:  Teach patient and/or family about disease process and management methods:  Teach patient and/or family about infection control measures (gloving, hand sanitation, MDROs, disposal of soiled dressings, antibiotic therapy): [place] Shake shoes before donning, no bare feet, daily visual inspection of both feet, do not soak feet, skin care, routine podiatry exams:    Teach patient on edema reducing measures:    Elevate legs above heart 30 minutes 3 times a day, walk 30 minutes daily, reduced sodium diet, diuretics as ordered, wear compression hose or wrap as ordered:  Evaluate effectiveness of all above measures every 4 weeks with Patient Specific CQI:    Other:  plan of care initiated 6/9/2016      Procedure      Wound #2: left great plantar toe     Nurse Dressing Change:   Wound #2 The wound located on the left great plantar toe  Applied 4% topical Lidocaine solution to wound/ulcer prior to debridement for pain control  Wound care rendered as per Physician/Advanced Practitioner order/plan  Return to [address] [place] [address] 1 week  Comments:    Excisional Debridement Subcutaneous Tissue:   Wound was excisionally debrided to subcutaneous tissue as follows     Prior to the procedure, the patient was identified using two identifiers, the general consent was signed, the proper site of procedure was identified, and a time out was taken  Anesthesia: Local anesthesia with 4% topical lidocaine was utilized prior to the procedure for pain control  #15 surgical blade, a curette and forceps was utilized to surgically excise devitalized tissue and/or slough through epidermis, dermis and into the subcutaneous tissue  The total sq cm excised was 1 2sq cm  See wound assessment for further details  There was moderate bleeding controlled with gentle pressure  the patient tolerated the procedure well without complication  CPT Code(s)   [id number] - Excisional DebridementTo Subcutaneous Tissue; first 20 sq cm  Provider Comments    Patient encouraged to wear his Darco wedge shoe  Future Appointments    Date/Time Provider Specialty Site   09/01/2016 09:30 AM Robert Conley DO Internal Medicine [facility]   06/16/2016 09:00 AM JASSON Larios  Vascular Surgery [facility] NEUROSURGICAL   07/14/2016 03:00 PM JASSON Minor   Nephrology [facility]   08/22/2016 09:45 AM Dewey Muller Endocrinology [facility] ENDOCRINOLOGY [facility]   07/19/2016 09:30 AM Cherylene Freud, [facility] Neurology [facility] NEUROLOGY [address]   06/16/2016 09:00 AM Jaqui Johnson     Signatures   Electronically signed by : Yoandy Etienne DPM; Jun 9 2016  5:00PM EST                       (Author)
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
## 2018-01-14 VITALS
WEIGHT: 194.25 LBS | OXYGEN SATURATION: 97 % | HEART RATE: 99 BPM | SYSTOLIC BLOOD PRESSURE: 138 MMHG | BODY MASS INDEX: 28.77 KG/M2 | TEMPERATURE: 97 F | HEIGHT: 69 IN | DIASTOLIC BLOOD PRESSURE: 72 MMHG

## 2018-01-14 NOTE — MISCELLANEOUS
Assessment    1  Chronic diastolic congestive heart failure (428 32,428 0) (I50 32)   2  Atrial fibrillation (427 31) (I48 91)   3  Chronic obstructive pulmonary disease (496) (J44 9)   4  CKD (chronic kidney disease), stage III (585 3) (N18 3)    Discussion/Summary  Discussion Summary:   #1  Exacerbation of chronic diastolic congestive heart failure  As mentioned patient was vigorously diuresed while he was in the hospital and is now back to baseline  They have increased his daily diarrhetic dose to Demadex 40 mg daily  He will have a follow-up next week with his cardiologist     #2  Chronic age for ablation  Patient's heart rate is stable and controlled with present treatment  Patient continues on daily Coumadin    #3  Chronic obstructive pulmonary disease  Patient has had no acute exacerbations recently  He was reminded the importance of having his annual flu vaccine when it becomes available this year  #4  Chronic kidney disease stage III  Patient's kidney function was followed closely while he was in the hospital area patient will continue with follow-up with his nephrologist    Counseling Documentation With Imm: The patient, patient's family was counseled regarding diagnostic results, instructions for management, risk factor reductions, prognosis, patient and family education, impressions, risks and benefits of treatment options, importance of compliance with treatment  Medication SE Review and Pt Understands Tx: Possible side effects of new medications were reviewed with the patient/guardian today  The treatment plan was reviewed with the patient/guardian  The patient/guardian understands and agrees with the treatment plan      Chief Complaint  Chief Complaint Free Text Note Form: Patient is here today for transition of care visit after recent hospitalization   Chief Complaint Chronic Condition [facility]: Patient is here today for follow up of chronic conditions described in HPI        History of Present Illness  TCM Communication [facility]: The patient is being contacted for follow-up after hospitalization  He was hospitalized at [facility]  The date of admission: 8/4/2016, date of discharge: 8/10/2016  Diagnosis: CHF, atherosclerotic cardiovascular disease, chronic obstructive pulmonary disease  He was discharged to home  He scheduled a follow up appointment  The patient is currently asymptomatic  Counseling was provided to the patient and patient's family  Topics counseled included diagnostic results, instructions for management, risk factor reductions, prognosis, patient and family education, home health agency benefits, activities of daily living and importance of compliance with treatment  Communication performed and completed by Sai Hensley   HPI: Patient is an 27-year-old male with a history of multiple medical problems including atherosclerotic cardiovascular disease, hypertension, COPD, atrial fibrillation  Patient was admitted to the hospital on August 4, 2016 with chest tightness, congestive heart failure  Patient was seen by cardiology and a general internist  He was vigorously diuresed and then discharged home with the only change in medication was an increase in his Demadex to 20 mg per day  Patient states that since she's been home breathing is back to normal area he has shortness of breath exertion but no increased  Denies any chest pain or pressure  Review of Systems  Complete-Male:   Constitutional: feeling tired, but no fever, not feeling poorly, no recent weight gain, no chills and no recent weight loss  Eyes: eyesight problems, but no eye pain, no dryness of the eyes, eyes not red, no purulent discharge from the eyes and no itching of the eyes  ENT: hoarseness, but no earache, no nosebleeds, no hearing loss and no nasal discharge     Cardiovascular: lower extremity edema, but the heart rate was not slow, no chest pain, no intermittent leg claudication, the heart rate was not fast and no palpitations  Respiratory: shortness of breath and shortness of breath during exertion, but as noted in HPI, no cough, no orthopnea and no wheezing  Gastrointestinal: constipation and History of chronic constipation, but no abdominal pain, no nausea, no vomiting, no diarrhea and no blood in stools  Genitourinary: urinary hesitancy and nocturia, but no dysuria, no genital lesions, no incontinence and no testicular pain  Musculoskeletal: arthralgias and joint stiffness, but no joint swelling, no limb pain, no myalgias and no limb swelling  Integumentary: Sacral decubitus, but no rashes, no itching, no dry skin, no skin lesions and no skin wound  Neurological: No compliants of headache, no confusion, no convulsions, no numbness or tingling, no dizziness or fainting, no limb weakness, no difficulty walking  Psychiatric: Is not suicidal, no sleep disturbances, no anxiety or depression, no change in personality, no emotional problems  Endocrine: erectile dysfunction and feelings of weakness, but no proptosis, no muscle weakness, no deepening of the voice and no hot flashes  Hematologic/Lymphatic: a tendency for easy bruising, but no swollen glands, no tendency for easy bleeding and no swollen glands in the neck  Active Problems    1  Abnormal weight gain (783 1) (R63 5)   2  Accidental fall (E888 9) (W19 XXXA)   3  Acute bronchitis (466 0) (J20 9)   4  Acute Non-Q-wave Myocardial Infarction (410 70)   5  Ambulatory dysfunction (719 7) (R26 2)   6  Anemia (285 9) (D64 9)   7  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)   8  Angina pectoris (413 9) (I20 9)   9  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,440 9) (I25 10)   10  Asymptomatic carotid artery narrowing without infarction (433 10) (I65 29)   11  Atrial fibrillation (427 31) (I48 91)   12  Benign essential hypertension (401 1) (I10)   13  Bilateral leg edema (782 3) (R60 0)   14   Black tarry stools (578 1) (K92 1)   15  CABG   16  Cellulitis (On Exam)   17  Chest pain (786 50) (R07 9)   18  Chronic diastolic congestive heart failure (428 32,428 0) (I50 32)   19  Chronic kidney disease (585 9) (N18 9)   20  Chronic obstructive pulmonary disease (496) (J44 9)   21  CKD (chronic kidney disease), stage III (585 3) (N18 3)   22  Constipation, chronic (564 00) (K59 09)   23  Depression (311) (F32 9)   24  Diabetic neuropathy, type II diabetes mellitus (250 60,357 2) (E11 40)   25  DMII (diabetes mellitus, type 2) (250 00) (E11 9)   26  Drug eruption (693 0) (L27 0)   27  Dysphagia (787 20) (R13 10)   28  Dyspnea (786 09) (R06 00)   29  Edema (782 3) (R60 9)   30  GERD (gastroesophageal reflux disease) (530 81) (K21 9)   31  History of CVA (cerebrovascular accident) (V12 54) (Z86 73)   32  Hyperkalemia (276 7) (E87 5)   33  Hyperlipidemia (272 4) (E78 5)   34  Hypoglycemia (251 2) (E16 2)   35  Microalbuminuria (791 0) (R80 9)   36  Neuropathy (355 9) (G62 9)   37  Non-healing open wound of right groin (879 5) (S31 103A)   38  Non-proliferative diabetic retinopathy (250 50,362 03) (E11 329)   39  Peripheral vascular disease (443 9) (I73 9)   40  Postoperative state (V45 89) (Z98 89)   41  Prostate disorder (602 9) (N42 9)   42  Puncture Wound Of Foot (892 0)   43  Sacral decubitus ulcer, stage II (838 63,828 77) (L89 152)   44  Secondary hyperparathyroidism (588 81) (N25 81)   45  Sinusitis (473 9) (J32 9)   46  Skin ulcer, chronic (707 9) (L98 499)   47  Type 2 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 (250 80,707 15) (E11 621,L97 529)   48  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)   49  Vitamin D deficiency (268 9) (E55 9)    Past Medical History    1  History of Denial Of Any Significant Medical History   2  History of angina (V12 59) (Z86 79)   3  History of blood transfusion (V15 89) (Z92 89)   4  History of PTCA (V45 82) (Z98 61)   5  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)   6  History of Pulmonary edema (514) (J81 1)    Surgical History    1  History of CABG   2  CABG   3  History of Cataract Surgery   4  History of Cath Stent Placement   5  History of Hernia Repair   6  History of Surgery For Aneurysm   7  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    Family History  Mother    1  Family history of Diabetes Mellitus (V18 0)  Sister    2  Family history of malignant neoplasm (V16 9) (Z80 9)  Spouse    3  Family history of cardiac disorder (V17 49) (Z82 49)  Family History    4  Family history of Cancer   5  Family history of Coronary Artery Disease (V17 49)   6  Family history of Diabetes Mellitus (V18 0)    Social History    · Denied: History of Always uses seat belt   · Caffeine use (V49 89) (F15 90)   · Daily caffeine consumption, 1 serving a day   · Does not exercise (V69 0) (Z72 3)   · Former smoker (W89 09) (Z87 891)   · Marital History - Currently    · No drug use   · Stopped Drinking Alcohol    Current Meds   1  Advair Diskus 250-50 MCG/DOSE Inhalation Aerosol Powder Breath Activated; INHALE 1   PUFF TWICE DAILY  RINSE MOUTH AFTER USE; Therapy: 86Lqk9391 to (Evaluate:59Luc1962) Recorded   2  Albuterol Sulfate (2 5 MG/3ML) 0 083% Inhalation Nebulization Solution; USE 1 UNIT   DOSE VIA NEBULIZER  4 TIMES A DAY AS NEEDED Recorded   3  AmLODIPine Besylate 2 5 MG Oral Tablet; Take 1 tablet daily; Therapy: 13ALY7958 to (Evaluate:18Oxo7482)  Requested for: 26Xxh2526; Last   Rx:39Gvf1695 Ordered   4  Aspirin Low Dose 81 MG TABS; Take 1 tablet daily Recorded   5  Atorvastatin Calcium 40 MG Oral Tablet; TAKE 1 TABLET DAILY AT BEDTIME; Therapy: 27PYL5163 to (Evaluate:05Jan2017)  Requested for: 53OCP4994; Last   Rx:11Jan2016 Ordered   6  Centrum Silver Oral Tablet; TAKE 1 TABLET DAILY; Therapy: (Recorded:05Lqs3567) to Recorded   7  Cinnamon TABS; take one tablet daily; Therapy: (Recorded:03Mww2603) to Recorded   8   Citalopram Hydrobromide 10 MG Oral Tablet; TAKE 1 TABLET AT BEDTIME; Therapy: 67RIV4055 to (Last VL:97ULU9334)  Requested for: 73XDQ8932 Ordered   9  Clopidogrel Bisulfate 75 MG Oral Tablet; Take 1 tablet daily; Therapy: 39BDG6103 to (95 549169)  Requested for: 29WGE6877; Last   Rx:30Wza1818 Ordered   10  Daliresp 500 MCG Oral Tablet; TAKE 1 TABLET DAILY; Therapy: (Recorded:03Dzm0916) to Recorded   11  FerrouSul 325 (65 Fe) MG Oral Tablet; TAKE 1 TABLET TWICE DAILY WITH MEALS; Therapy: 04KSQ7586 to (Yady Lundberg)  Requested for: 88SOB0652; Last    Rx:46Uaq7676 Ordered   12  Fish Oil 1200 MG Oral Capsule; Therapy: (Recorded:77Zhq5703) to Recorded   13  Isosorbide Mononitrate ER 30 MG Oral Tablet Extended Release 24 Hour; TAKE 1    TABLET DAILY; Therapy: (Recorded:03Mar2016) to Recorded   14  Lantus SoloStar 100 UNIT/ML Subcutaneous Solution Pen-injector; Inject 35 units at    bedtime as directed by doctor; Therapy: (Recorded:94Olz4890) to Recorded   15  Metoprolol Tartrate 50 MG Oral Tablet; take 1/2 tablet by mouth twice a dayl; Therapy: 28Mar2011-(Evaluate:09Apr2017)  Requested for: 80Eou1502 Ordered   16  Multi-betic Diabetes Oral Tablet; Therapy: (Recorded:94Tty2221) to Recorded   17  Nitrostat 0 4 MG Sublingual Tablet Sublingual; PLACE 1 TABLET UNDER THE TONGUE    EVERY 5 MINUTES FOR UP TO 3 DOSES AS NEEDED FOR CHEST PAIN  CALL    911 IF PAIN PERSISTS; Therapy: 80RLC7026 to (Cachorro Cheng)  Requested for: 05Apr2016; Last    Rx:05Apr2016 Ordered   18  NovoLOG FlexPen 100 UNIT/ML Subcutaneous Solution Pen-injector; imject 14 units    before breakfast and lunch and 14 before dinner; Therapy: 06VSD3619 to (Richard Martinez)  Requested for: 35RLA5328; Last    Rx:08Zlv5304 Ordered   19  NovoLOG FlexPen 100 UNIT/ML Subcutaneous Solution Pen-injector; Inject 12 units @    breakfastk, 12u at lunch and 14u at supper as directed by your    doctor; Therapy: (Recorded:09Pfk1885) to Recorded   20   Pantoprazole Sodium 40 MG Oral Tablet Delayed Release; Take 1 tablet twice daily; Therapy: 61EVG9185 to (Evaluate:44Ngp8914)  Requested for: 76Uvv8149; Last    Rx:51Ywf2539 Ordered   21  Spiriva HandiHaler 18 MCG Inhalation Capsule; USE AS DIRECTED; Therapy: 31Ocy9618 to Recorded   22  Tamsulosin HCl - 0 4 MG Oral Capsule; TAKE ONE CAPSULE BY MOUTH AT BEDTIME; Therapy: (Recorded:77Igo3434) to Recorded   23  Terazosin HCl - 2 MG Oral Capsule; TAKE 2 CAPSULES AT BEDTIME; Therapy: 31CUJ8888 to (Lizzyeecain Mack)  Requested for: 25EBH9925; Last    Rx:28Rqa7507 Ordered   24  Torsemide 20 MG Oral Tablet; TAKE 1 TABLET DAILY; Therapy: (Recorded:63Hcs3810) to  Requested for: 57BDM0979 Recorded   25  Vitamin B-12 TABS; 2,000 mcg daily; Therapy: (Recorded:97Niq6892) to Recorded   26  Vitamin D3 1000 UNIT Oral Tablet; TAKE 4 TABLET Daily; Last LEUNG:39CVA2225 Ordered    Allergies    1  No Known Drug Allergies    2  No Known Environmental Allergies   3  No Known Food Allergies    Vitals  Signs   Recorded: 51NMM2362 12:23PK   Systolic: 160  Diastolic: 62  Heart Rate: 83  Respiration: 18  Temperature: 99 5 F  O2 Saturation: 97  Height: 5 ft 9 in  Weight: 183 lb 6 08 oz  BMI Calculated: 27 08  BSA Calculated: 1 99    Physical Exam    Constitutional   General appearance: Abnormal   Pleasant 51-year-old male who is awake alert  Eyes   Conjunctiva and lids: No swelling, erythema, or discharge  Pupils and irises: Equal, round and reactive to light  Ears, Nose, Mouth, and Throat   External inspection of ears and nose: Normal     Otoscopic examination: Tympanic membrance translucent with normal light reflex  Canals patent without erythema  Nasal mucosa, septum, and turbinates: Normal without edema or erythema  Oropharynx: Abnormal   Slightly erythematous posterior airway with a whitish postnasal drip  Pulmonary   Respiratory effort: No increased work of breathing or signs of respiratory distress    Some mild chronic shortness of breath with exertion  Auscultation of lungs: Abnormal   Decreased breath sounds and air flow with rhonchi heard both anterior and posteriorly which are faint, he states these improve after respiratory treatments  Cardiovascular   Palpation of heart: Normal PMI, no thrills  Auscultation of heart: Normal rate and rhythm, normal S1 and S2, without murmurs  Examination of extremities for edema and/or varicosities: Abnormal   Trace edema bilateral lower extremities which is improved since his last visit  Carotid pulses: Abnormal   Bilateral carotid bruits  Abdomen   Abdomen: Abnormal   Obese soft nontender with positive decreased bowel sounds Ã4 quadrants  Liver and spleen: No hepatomegaly or splenomegaly  Lymphatic   Palpation of lymph nodes in neck: No lymphadenopathy  Musculoskeletal   Gait and station: Abnormal   Antalgic gait walking with a boot on left foot for nonhealing ulcer  Digits and nails: Abnormal   Diffuse arthritic changes hands and digits  Inspection/palpation of joints, bones, and muscles: Abnormal   Diffuse osteoarthritis, tenderness and ecchymosis but no deformity to the right chest wall in the mid axillary line to the area of T6-T10  Skin   Skin and subcutaneous tissue: Normal without rashes or lesions  Neurologic   Cranial nerves: Cranial nerves 2-12 intact  Reflexes: Abnormal   Absent patellar and Achilles deep tendon reflexes  Sensation: Abnormal     Psychiatric   Orientation to person, place and time: Normal     Mood and affect: Abnormal   Flat but pleasant mood today     Diabetic Foot Screen: Abnormal        Future Appointments    Date/Time Provider Specialty Site   01/27/2017 09:30 AM Demarcus Echevarria MD Neurology [facility] NEUROLOGY [facility]   09/01/2016 09:30 AM Cassy Vincent DO Internal Medicine [facility] INTERNAL MED   08/22/2016 09:45 AM Felicita Hoover Endocrinology [facility] ENDOCRINOLOGY [facility]   08/16/2016 11:40 AM Elen Hunter JASSON Foster   Former[facility]     Signatures   Electronically signed by : Silvio Schmitt OM; Aug 11 2016 12:11PM EST                       (Author)    Electronically signed by : Yoko Araiza DO; Aug 12 2016  3:47PM EST                       (Author)

## 2018-01-14 NOTE — PROGRESS NOTES
Assessment    1  Benign essential hypertension (401 1) (I10)   2  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,440 9) (I25 10)   3  Atrial fibrillation (427 31) (I48 91)   4  DMII (diabetes mellitus, type 2) (250 00) (E11 9)    Plan  DMII (diabetes mellitus, type 2)    · Follow-up visit in 3 months Evaluation and Treatment  Follow-up  Status: Hold For - Scheduling   Requested for: 97Nox8623    Discussion/Summary  Discussion Summary:   #1  Hypertension  Patient's blood pressure is showing good control with present treatment  Patient denies any problems with lightheadedness or dizziness or episodes of hypotension  Patient will continue to be evaluated in our office and with his cardiologist     #2  History of atherosclerotic cardiovascular disease, recurrent CHF  Patient is stable at this point in time but has a history of recurrent congestive heart failure  Patient has no evidence of any acute ischemia at this point also  We'll continue to monitor the patient along with his cardiologist and treat as indicated  #3  Chronic atrial fibrillation  Patient's heart rate is controlled and he continues on oral anticoagulations  Patient has had no evidence of bleeding although he does have a history of GI bleed in the past     #4  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  Patient's sugar managed by his endocrinologist and he is stable at this time  Patient did have an abnormal monofilament and diabetic foot exam today but no significant changes  Patient has had some progression of his neuropathy and a recent fall with injury to his right shin  Counseling Documentation With Imm: The patient, patient's family was counseled regarding instructions for management, risk factor reductions, prognosis, patient and family education, impressions, risks and benefits of treatment options, importance of compliance with treatment     Immunization Counseling The parent/guardian was counseled on the following vaccine components:  Total number of vaccine components counseled: 1  Medication SE Review and Pt Understands Tx: Possible side effects of new medications were reviewed with the patient/guardian today  The treatment plan was reviewed with the patient/guardian  The patient/guardian understands and agrees with the treatment plan      Chief Complaint  Chief Complaint Free Text Note Form: Patient is here for a three month follow up w/o labs      Advance Directives  Advance Directive [facility]:   YES - Patient has an advance health care directive  Durable Power of  For Healthcare:    Name:    Relationship: Daughter   Alternate Health Care Proxy:    Name: There are no alternatives   Capacity/Competence: This patient has full decision making capacity for discussion of advance care planning and This patient has full decision making competency for discussion of advance care planning  Summary of Advance Directive Conversation  Patient states he does not want any aggressive treatment, comfort care only, no CPR and no intubation  The provider spent 5 minutes minutes discussing Advance Directives  History of Present Illness  HPI: Patient is an 80-year-old male with a history of multiple medical problems as outlined previously  Patient is here today for routine follow-up after a four-month period of time  Patient states he's doing relatively well and is stable at this point in time and as noted has not had any recent hospitalizations  He states his wife is making sure that he is compliant with medication and taking his water pills as directed  He continues to follow-up with cardiology, nephrology, urology, endocrinology  Review of Systems  Complete-Male:   Constitutional: feeling tired, but no fever, not feeling poorly, no recent weight gain, no chills and no recent weight loss     Eyes: eyesight problems, but no eye pain, no dryness of the eyes, eyes not red, no purulent discharge from the eyes and no itching of the eyes    ENT: hoarseness, but no earache, no nosebleeds, no hearing loss and no nasal discharge  Cardiovascular: Patient is pleased stating that he has no edema, but the heart rate was not slow, no chest pain, no intermittent leg claudication, the heart rate was not fast, no palpitations and no extremity edema  Respiratory: shortness of breath, shortness of breath during exertion and Chronic shortness of breath with exertion but no increase and he feels his respiratory status is stable at this point in time, but no cough, no orthopnea and no wheezing  Gastrointestinal: constipation and History of chronic constipation, but no abdominal pain, no nausea, no vomiting, no diarrhea and no blood in stools  Genitourinary: urinary hesitancy and nocturia, but no dysuria, no genital lesions, no incontinence and no testicular pain  Musculoskeletal: arthralgias and joint stiffness, but no joint swelling, no limb pain, no myalgias and no limb swelling  Integumentary: skin wound and Healing abrasion to his right shin, but no rashes, no itching, no dry skin and no skin lesions  Neurological: numbness, tingling and Peripheral neuropathy with episodic falls, but no headache, no confusion, no dizziness, no limb weakness, no convulsions, no fainting and no difficulty walking  Psychiatric: Is not suicidal, no sleep disturbances, no anxiety or depression, no change in personality, no emotional problems  Endocrine: erectile dysfunction and feelings of weakness, but no proptosis, no muscle weakness, no deepening of the voice and no hot flashes  Hematologic/Lymphatic: a tendency for easy bleeding and a tendency for easy bruising, but no swollen glands and no swollen glands in the neck  ROS Reviewed:   ROS reviewed  Active Problems    1  Abnormal weight gain (783 1) (R63 5)   2  Accidental fall (E888 9) (W19 XXXA)   3  Acute bronchitis (466 0) (J20 9)   4  Acute Non-Q-wave Myocardial Infarction (410 70)   5  Ambulatory dysfunction (719 7) (R26 2)   6  Anemia (285 9) (D64 9)   7  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)   8  Angina pectoris (413 9) (I20 9)   9  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,440 9) (I25 10)   10  Asymptomatic carotid artery narrowing without infarction (433 10) (I65 29)   11  Atrial fibrillation (427 31) (I48 91)   12  Benign essential hypertension (401 1) (I10)   13  Bilateral leg edema (782 3) (R60 0)   14  Black tarry stools (578 1) (K92 1)   15  CABG   16  Cellulitis (On Exam)   17  Chest pain (786 50) (R07 9)   18  Chronic diastolic congestive heart failure (428 32,428 0) (I50 32)   19  Chronic kidney disease (585 9) (N18 9)   20  Chronic obstructive pulmonary disease (496) (J44 9)   21  CKD (chronic kidney disease), stage III (585 3) (N18 3)   22  Constipation, chronic (564 00) (K59 09)   23  Depression (311) (F32 9)   24  Diabetic neuropathy, type II diabetes mellitus (250 60,357 2) (E11 40)   25  DMII (diabetes mellitus, type 2) (250 00) (E11 9)   26  Drug eruption (693 0) (L27 0)   27  Dysphagia (787 20) (R13 10)   28  Dyspnea (786 09) (R06 00)   29  Edema (782 3) (R60 9)   30  GERD (gastroesophageal reflux disease) (530 81) (K21 9)   31  History of CVA (cerebrovascular accident) (V12 54) (Z86 73)   32  Hyperkalemia (276 7) (E87 5)   33  Hyperlipidemia (272 4) (E78 5)   34  Hypoglycemia (251 2) (E16 2)   35  Microalbuminuria (791 0) (R80 9)   36  Neuropathy (355 9) (G62 9)   37  Non-healing open wound of right groin (879 5) (S31 103A)   38  Non-proliferative diabetic retinopathy (250 50,362 03) (E11 329)   39  Peripheral vascular disease (443 9) (I73 9)   40  Postoperative state (V45 89) (Z98 89)   41  Prostate disorder (602 9) (N42 9)   42  Puncture Wound Of Foot (892 0)   43  Sacral decubitus ulcer, stage II (653 67,016 68) (L89 152)   44  Secondary hyperparathyroidism (588 81) (N25 81)   45  Sinusitis (473 9) (J32 9)   46   Skin ulcer, chronic (707 9) (L98 499)   47  Type 2 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 (250 80,707 15) (E11 621,L97 529)   48  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)   49  Vitamin D deficiency (268 9) (E55 9)    Past Medical History    1  History of Denial Of Any Significant Medical History   2  History of angina (V12 59) (Z86 79)   3  History of blood transfusion (V15 89) (Z92 89)   4  History of PTCA (V45 82) (Z98 61)   5  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)   6  History of Pulmonary edema (514) (J81 1)    Surgical History    1  History of CABG   2  CABG   3  History of Cataract Surgery   4  History of Cath Stent Placement   5  History of Hernia Repair   6  History of Surgery For Aneurysm   7  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    Family History  Mother    1  Family history of Diabetes Mellitus (V18 0)  Sister    2  Family history of malignant neoplasm (V16 9) (Z80 9)  Spouse    3  Family history of cardiac disorder (V17 49) (Z82 49)  Family History    4  Family history of Cancer   5  Family history of Coronary Artery Disease (V17 49)   6  Family history of Diabetes Mellitus (V18 0)    Social History    · Denied: History of Always uses seat belt   · Caffeine use (V49 89) (F15 90)   · Daily caffeine consumption, 1 serving a day   · Does not exercise (V69 0) (Z72 3)   · Former smoker (C98 11) (Z87 891)   · Marital History - Currently    · No drug use   · Stopped Drinking Alcohol    Current Meds   1  Advair Diskus 250-50 MCG/DOSE Inhalation Aerosol Powder Breath Activated; INHALE 1 PUFF   TWICE DAILY  RINSE MOUTH AFTER USE; Therapy: 14Tbd5947 to (Evaluate:82Bax0699) Recorded   2  Albuterol Sulfate (2 5 MG/3ML) 0 083% Inhalation Nebulization Solution; USE 1 UNIT DOSE VIA   NEBULIZER  4 TIMES A DAY AS NEEDED Recorded   3  AmLODIPine Besylate 2 5 MG Oral Tablet; Take 1 tablet daily; Therapy: 04LFB8136 to (Evaluate:33Uns0689)  Requested for: 68Snv4694;  Last Rx: 52BGH4028   Ordered   4  Aspirin Low Dose 81 MG TABS; Take 1 tablet daily Recorded   5  Atorvastatin Calcium 40 MG Oral Tablet; TAKE 1 TABLET DAILY AT BEDTIME; Therapy: 77HTX2509 to (Evaluate:05Jan2017)  Requested for: 28MBZ2272; Last Rx:11Jan2016   Ordered   6  BD Pen Needle Mae U/F 32G X 4 MM Miscellaneous; four times daily; Therapy: 99LUI7678 to (Evaluate:13Nov2016)  Requested for: 86Mte5084; Last Rx:52Lpv1306   Ordered   7  Centrum Silver Oral Tablet; TAKE 1 TABLET DAILY; Therapy: (Recorded:75Msl2500) to Recorded   8  Cinnamon 500 MG Oral Tablet; 4 tablets daily; Therapy: (Maisha Gaxiola) to Recorded   9  Citalopram Hydrobromide 10 MG Oral Tablet; TAKE 1 TABLET AT BEDTIME; Therapy: 12FND1729 to (Last RV:93MRY4257)  Requested for: 78JPZ0071 Ordered   10  Clopidogrel Bisulfate 75 MG Oral Tablet; Take 1 tablet daily; Therapy: 33OQO6706 to (Jess Vann)  Requested for: 39XTH0404; Last Rx:17Qze7882    Ordered   11  Daliresp 500 MCG Oral Tablet; TAKE 1 TABLET DAILY; Therapy: (Recorded:08Ztt2208) to Recorded   12  FerrouSul 325 (65 Fe) MG Oral Tablet; TAKE 1 TABLET DAILY; Therapy: (Maisha Gaxiola) to Recorded   13  Fish Oil 1200 MG Oral Capsule; three times daily; Therapy: (Maisha Gaxiola) to Recorded   14  Isosorbide Mononitrate ER 30 MG Oral Tablet Extended Release 24 Hour; TAKE 1 TABLET DAILY; Therapy: (Recorded:03Mar2016) to Recorded   15  Lantus SoloStar 100 UNIT/ML Subcutaneous Solution Pen-injector; Inject 35 units at bedtime as    directed by doctor; Therapy: (Recorded:32Kzz8446) to Recorded   16  Metoprolol Tartrate 50 MG Oral Tablet; take 1/2 tablet by mouth twice a dayl; Therapy: 28Mar2011-(Evaluate:09Apr2017)  Requested for: 14Apr2016 Ordered   17  Multi-betic Diabetes Oral Tablet; TAKE 1 TABLET DAILY; Therapy: (Maisha Gaxiola) to Recorded   18  NovoLOG FlexPen 100 UNIT/ML Subcutaneous Solution Pen-injector;  Inject 12 units @ breakfastk,    12u at lunch and 14u at supper as directed by your doctor; Therapy: (Recorded:61Xvq2303) to Recorded   19  Pantoprazole Sodium 40 MG Oral Tablet Delayed Release; Take 1 tablet twice daily; Therapy: 42WGW5358 to (Evaluate:55Hoj6852)  Requested for: 64Pwx2251; Last Rx:70Ntg6125    Ordered   20  Spiriva HandiHaler 18 MCG Inhalation Capsule; USE AS DIRECTED; Therapy: 57Rcv2777 to Recorded   21  Tamsulosin HCl - 0 4 MG Oral Capsule; TAKE ONE CAPSULE BY MOUTH AT BEDTIME; Therapy: (Recorded:32Giv3789) to Recorded   22  Terazosin HCl - 2 MG Oral Capsule; TAKE 2 CAPSULES AT BEDTIME; Therapy: 26XLX4147 to (Evaluate:34Pmu5512)  Requested for: 41Gsk9676; Last Rx:73Kkp3704    Ordered   23  Torsemide 20 MG Oral Tablet; Take 1 tablet daily; Therapy: (Rebecca Leahy) to  Requested for: 84Itr0194 Recorded   24  Vitamin B-12 TABS; 2,000 mcg daily; Therapy: (Recorded:16Sgz2178) to Recorded   25  Vitamin D3 2000 UNIT Oral Capsule; 2 daily equals 4000 iu daily; Therapy: (Recorded:74Dln3674) to Recorded    Allergies    1  No Known Drug Allergies    2  No Known Environmental Allergies   3  No Known Food Allergies    Vitals  Vital Signs    Recorded: 15NFQ8798 85:51GL   Systolic 508   Diastolic 64   Heart Rate 79   Temperature 97 3 F   O2 Saturation 99   Height 5 ft 9 in   Weight 186 lb 4 00 oz   BMI Calculated 27 5   BSA Calculated 2     Physical Exam    Constitutional   General appearance: Abnormal   Pleasant 80-year-old male who is awake alert  Eyes   Conjunctiva and lids: No swelling, erythema, or discharge  Pupils and irises: Equal, round and reactive to light  Ears, Nose, Mouth, and Throat   External inspection of ears and nose: Normal     Otoscopic examination: Tympanic membrance translucent with normal light reflex  Canals patent without erythema  Nasal mucosa, septum, and turbinates: Normal without edema or erythema      Oropharynx: Abnormal   Slightly erythematous posterior airway with a whitish postnasal drip  Pulmonary   Respiratory effort: No increased work of breathing or signs of respiratory distress  Some mild chronic shortness of breath with exertion  Auscultation of lungs: Abnormal   Decreased breath sounds and air flow with rhonchi heard both anterior and posteriorly which are faint, he states these improve after respiratory treatments  Cardiovascular   Palpation of heart: Normal PMI, no thrills  Auscultation of heart: Normal rate and rhythm, normal S1 and S2, without murmurs  Examination of extremities for edema and/or varicosities: Abnormal   Trace edema bilateral lower extremities which is improved since his last visit  Carotid pulses: Abnormal   Bilateral carotid bruits  Abdomen   Abdomen: Abnormal   Obese soft nontender with positive decreased bowel sounds Ã4 quadrants  Liver and spleen: No hepatomegaly or splenomegaly  Lymphatic   Palpation of lymph nodes in neck: No lymphadenopathy  Musculoskeletal   Gait and station: Abnormal   Antalgic gait walking with a boot on left foot for nonhealing ulcer  Digits and nails: Abnormal   Diffuse arthritic changes hands and digits  Inspection/palpation of joints, bones, and muscles: Abnormal   Diffuse osteoarthritis, tenderness and ecchymosis but no deformity to the right chest wall in the mid axillary line to the area of T6-T10  Skin   Skin and subcutaneous tissue: Normal without rashes or lesions  Examination of the skin for lesions: Abnormal   Patient states that these had significant healing to the wound of his great toe on the left and speaking seen by wound management  Neurologic   Cranial nerves: Cranial nerves 2-12 intact  Reflexes: Abnormal   Absent patellar and Achilles deep tendon reflexes  Sensation: Abnormal     Psychiatric   Orientation to person, place and time: Normal     Mood and affect: Abnormal   Flat but pleasant mood today     Diabetic Foot Screen: Abnormal        Future Appointments    Date/Time Provider Specialty Site   01/27/2017 09:30 AM Salomón Bolton MD Neurology [facility]   11/28/2016 08:30 AM JASSON Guajardo   Endocrinology [facility] ENDOCRINOLOGY [facility] [place] [facility]     Signatures   Electronically signed by : Vonzell Landau, DO; Sep  1 2016 10:13AM EST                       (Author)

## 2018-01-15 NOTE — MISCELLANEOUS
History of Present Illness  Mr Fariba Cid is an 80year old male who was recently admitted to [address] [place] [address] on 8/04 to 8/10/2016 with chest tightness, acute on chronic diastolic heart failure  This is felt to be secondary to dietary indiscretion  He was diuresed with IV Lasix and switched to torsemide 40 mg in the morning and 20 mg in the evening  Discharge creatinine was 1 5  Discharge weight 182 pounds  I called Mr Monica Ho today for an initial heart failure follow up phone call  The [facility] nurse is coming to his home today  He is feeling well  His weight today is 183 pounds  I have re enforced daily weights and to call our office if he gains 3 pounds and is a 5 pounds in one week, experiences worsening shortness of breath, or LE edema  I have reinforced a 2 g sodium diet, 1500 cc fluid restrictions  He will follow-up with Dr Shen Jones on 8/16/2016  Current Meds   1  Advair Diskus 250-50 MCG/DOSE Inhalation Aerosol Powder Breath Activated; INHALE 1   PUFF TWICE DAILY  RINSE MOUTH AFTER USE; Therapy: 94Tcp6989 to (Evaluate:10Jfr1209) Recorded   2  Albuterol Sulfate (2 5 MG/3ML) 0 083% Inhalation Nebulization Solution; USE 1 UNIT   DOSE VIA NEBULIZER  4 TIMES A DAY AS NEEDED Recorded   3  AmLODIPine Besylate 2 5 MG Oral Tablet; Take 1 tablet daily; Therapy: 42TEP5867 to (Evaluate:20Bxh1249)  Requested for: 58Aez7781; Last   Rx:01Vgf1707 Ordered   4  Aspirin Low Dose 81 MG TABS; Take 1 tablet daily Recorded   5  Atorvastatin Calcium 40 MG Oral Tablet; TAKE 1 TABLET DAILY AT BEDTIME; Therapy: 11HNT9234 to (Evaluate:05Jan2017)  Requested for: 12VEG5238; Last   Rx:11Jan2016 Ordered   6  Centrum Silver Oral Tablet; TAKE 1 TABLET DAILY; Therapy: (Recorded:18Adq1753) to Recorded   7  Cinnamon TABS; take one tablet daily; Therapy: (Recorded:04Iek1412) to Recorded   8  Citalopram Hydrobromide 10 MG Oral Tablet; TAKE 1 TABLET AT BEDTIME;    Therapy: 33SQF3174 to (Last JH:95OPW9150) Requested for: 76JHO5819 Ordered   9  Clopidogrel Bisulfate 75 MG Oral Tablet; Take 1 tablet daily; Therapy: 75LTN8825 to (95 832333)  Requested for: 28RGK3069; Last   Rx:84Bpt7446 Ordered   10  Daliresp 500 MCG Oral Tablet; TAKE 1 TABLET DAILY; Therapy: (Recorded:29Lnh9605) to Recorded   11  FerrouSul 325 (65 Fe) MG Oral Tablet; TAKE 1 TABLET TWICE DAILY WITH MEALS; Therapy: 65JQA5172 to (Jessica Cons)  Requested for: 42RWH9389; Last    Rx:58Mxn0655 Ordered   12  Fish Oil 1200 MG Oral Capsule; Therapy: (Recorded:41Pua8977) to Recorded   13  Isosorbide Mononitrate ER 30 MG Oral Tablet Extended Release 24 Hour; TAKE 1    TABLET DAILY; Therapy: (Recorded:03Mar2016) to Recorded   14  Lantus SoloStar 100 UNIT/ML Subcutaneous Solution Pen-injector; Inject 35 units at    bedtime as directed by doctor; Therapy: (Recorded:20May2016) to Recorded   15  Metoprolol Tartrate 50 MG Oral Tablet; take 1/2 tablet by mouth twice a dayl; Therapy: 28Mar2011-(Evaluate:09Apr2017)  Requested for: 13Mor0447 Ordered   16  Multi-betic Diabetes Oral Tablet; Therapy: (Recorded:32Vqe2816) to Recorded   17  Nitrostat 0 4 MG Sublingual Tablet Sublingual; PLACE 1 TABLET UNDER THE TONGUE    EVERY 5 MINUTES FOR UP TO 3 DOSES AS NEEDED FOR CHEST PAIN  CALL    911 IF PAIN PERSISTS; Therapy: 26WEV7613 to (Aria Owens)  Requested for: 05Apr2016; Last    Rx:05Apr2016 Ordered   18  NovoLOG FlexPen 100 UNIT/ML Subcutaneous Solution Pen-injector; imject 14 units    before breakfast and lunch and 14 before dinner; Therapy: 56ZLV0784 to (Jessica Cons)  Requested for: 07XQH0783; Last    Rx:09Dqg7852 Ordered   19  NovoLOG FlexPen 100 UNIT/ML Subcutaneous Solution Pen-injector; Inject 12 units @    breakfastk, 12u at lunch and 14u at supper as directed by your    doctor; Therapy: (Recorded:03Fyf9657) to Recorded   20  Pantoprazole Sodium 40 MG Oral Tablet Delayed Release;  Take 1 tablet twice daily; Therapy: 38XDL2291 to (Evaluate:73Zkn0239)  Requested for: 21Xrc6212; Last    Rx:22Qvs4159 Ordered   21  Spiriva HandiHaler 18 MCG Inhalation Capsule; USE AS DIRECTED; Therapy: 00Cxe9844 to Recorded   22  Tamsulosin HCl - 0 4 MG Oral Capsule; TAKE ONE CAPSULE BY MOUTH AT BEDTIME; Therapy: (Recorded:60Tne0897) to Recorded   23  Terazosin HCl - 2 MG Oral Capsule; TAKE 2 CAPSULES AT BEDTIME; Therapy: 20DRS5491 to (Edgardo Jennings)  Requested for: 21HDI3254; Last    Rx:43Qzj0212 Ordered   24  Torsemide 20 MG Oral Tablet; TAKE 1 TABLET DAILY; Therapy: (Recorded:57Pnm3668) to  Requested for: 70EDL5645 Recorded   25  Vitamin B-12 TABS; 2,000 mcg daily; Therapy: (Recorded:00Duk3491) to Recorded   26  Vitamin D3 1000 UNIT Oral Tablet; TAKE 4 TABLET Daily; Last MW:51HXU1235 Ordered    Future Appointments    Date/Time Provider Specialty Site   01/27/2017 09:30 AM Gretel Brown MD Neurology [address]   08/12/2016 03:15 PM Alcira Amador DO Internal Medicine [facility] INTERNAL MED   09/01/2016 09:30 AM Alcira Amador DO Internal Medicine [facility] INTERNAL MED   08[facility]22[facility]2016 09:[facility] END[facility] [place] [facility]   08/16/2016 11:40 AM JASSON Hadley  Cardiology [facility] CARDIOLOGY [place]     Allergies    1  No Known Drug Allergies    2  No Known Environmental Allergies   3   No Known Food Allergies    Signatures   Electronically signed by : Nichole High, ; Aug 11 2016 11:40AM EST                       (Author)

## 2018-01-15 NOTE — PROGRESS NOTES
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)   4  Benign essential hypertension (401 1) (I10)   5  DMII (diabetes mellitus, type 2) (250 00) (E11 9)    Plan  DMII (diabetes mellitus, type 2)    · NovoLOG FlexPen 100 UNIT/ML Subcutaneous Solution Pen-injector; imject 14 units before  breakfast and lunch and 14 before dinner   · Follow-up visit in 3 months Evaluation and Treatment  Follow-up  Status: Hold For - Scheduling   Requested for: 55MTT0967    Discussion/Summary  Discussion Summary:   #1  Acute bronchitis/exacerbation of his chronic obstructive pulmonary disease  Patient is improved but still has some chronic congestion which may be secondary to his underlying disease  He was instructed to continue to use his inhalers as directed and he continues to follow-up with his pulmonologist  He was told if any fever or chills or changes in the color of sputum to please call immediately  #2  Atrial fibrillation  Patient's heart rate is controlled and patient is on oral anticoagulates  #3  History of atherosclerotic cardiovascular disease and recurrent congestive heart failure  Patient is stable at this point and will continue present medication  He does have routine follow-up with cardiology and he was told if increased weight increased swelling of feet or ankles, increased shortness of breath to please notify his physicians as soon as possible  #4  Hypertension  Controlled with present treatment  #5  Diabetes mellitus type 2  Patient will continue present treatment as per his endocrinologist  I reinforced the importance of having a dilated eye exam on a yearly basis patient states he will apply  We did perform a diabetic foot exam today  1-3     Counseling Documentation With Imm: The patient, patient's family was counseled regarding instructions for management, risk factor reductions, prognosis, patient and family education, impressions, risks and benefits of treatment options, importance of compliance with treatment  Medication SE Review and Pt Understands Tx: Possible side effects of new medications were reviewed with the patient/guardian today  The treatment plan was reviewed with the patient/guardian  The patient/guardian understands and agrees with the treatment plan      Chief Complaint  Chief Complaint Free Text Note Form: Patient is here today for his 3 week follow up      History of Present Illness  HPI: Patient is an 55-year-old male with a history of multiple medical problems  Is being seen today for follow-up after a visit approximately 3 weeks ago for an exacerbation of chronic obstructive pulmonary disease  Patient was placed on anti-biotics and oral steroids and since that visit is also been seen by his pulmonologist  Patient admits the fact that he's feeling better but is not 100% and still has some chest congestion  Patient is using his inhalers as directed  Patient has no new complaints today  He denies any chest pain or pressure  He continues with chronic shortness of breath but this is not increased      Review of Systems  Complete-Male:   Constitutional: feeling tired, but no fever, not feeling poorly, no recent weight gain, no chills and no recent weight loss  Eyes: No complaints of eye pain, no red eyes, no discharge from eyes, no itchy eyes  ENT: hoarseness, but no earache, no nosebleeds, no hearing loss and no nasal discharge  Cardiovascular: lower extremity edema, but the heart rate was not slow, no chest pain, no intermittent leg claudication, the heart rate was not fast and no palpitations  Respiratory: shortness of breath, cough and shortness of breath during exertion, but no orthopnea and no wheezing  Gastrointestinal: constipation and History of chronic constipation, but no abdominal pain, no nausea, no vomiting, no diarrhea and no blood in stools  Genitourinary: urinary hesitancy and nocturia, but no dysuria, no genital lesions, no incontinence and no testicular pain  Musculoskeletal: arthralgias and joint stiffness, but no joint swelling, no limb pain, no myalgias and no limb swelling  Integumentary: skin wound and Sacral decubitus, but no rashes, no itching, no dry skin and no skin lesions  Neurological: No compliants of headache, no confusion, no convulsions, no numbness or tingling, no dizziness or fainting, no limb weakness, no difficulty walking  Psychiatric: Is not suicidal, no sleep disturbances, no anxiety or depression, no change in personality, no emotional problems  Endocrine: erectile dysfunction and feelings of weakness, but no proptosis, no muscle weakness, no deepening of the voice and no hot flashes  Hematologic/Lymphatic: a tendency for easy bleeding and a tendency for easy bruising, but no swollen glands and no swollen glands in the neck  ROS Reviewed:   ROS reviewed  Active Problems    1  Abnormal weight gain (783 1) (R63 5)   2  Accidental fall (E888 9) (W19 XXXA)   3  Acute bronchitis (466 0) (J20 9)   4  Acute Non-Q-wave Myocardial Infarction (410 70)   5  Anemia (285 9) (D64 9)   6  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)   7  Angina pectoris (413 9) (I20 9)   8  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,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 10)   9  Asymptomatic carotid artery narrowing without infarction (433 10) (I65 29)   10  Atrial fibrillation (427 31) (I48 91)   11  Benign essential hypertension (401 1) (I10)   12  Black tarry stools (578 1) (K92 1)   13  CABG   14  Chest pain (786 50) (R07 9)   15  Chronic diastolic congestive heart failure (428 32,428 0) (I50 32)   16  Chronic kidney disease (585 9) (N18 9)   17  Chronic obstructive pulmonary disease (496) (J44 9)   18  Constipation, chronic (564 00) (K59 09)   19  Depression (311) (F32 9)   20  DMII (diabetes mellitus, type 2) (250 00) (E11 9)   21   Drug eruption (693 0) (L27 0)   22  Dysphagia (787 20) (R13 10)   23  Dyspnea (786 09) (R06 00)   24  Edema (782 3) (R60 9)   25  GERD (gastroesophageal reflux disease) (530 81) (K21 9)   26  Hyperkalemia (276 7) (E87 5)   27  Hyperlipidemia (272 4) (E78 5)   28  Hypoglycemia (251 2) (E16 2)   29  Microalbuminuria (791 0) (R80 9)   30  Neuropathy (355 9) (G62 9)   31  Non-healing open wound of right groin (879 5) (S31 103A)   32  Non-proliferative diabetic retinopathy (250 50,362 03) (E11 329)   33  Peripheral vascular disease (443 9) (I73 9)   34  Postoperative state (V45 89) (Z98 89)   35  Prostate disorder (602 9) (N42 9)   36  Puncture Wound Of Foot (892 0)   37  Sacral decubitus ulcer, stage II (305 84,206 85) (L89 152)   38  Secondary hyperparathyroidism (588 81) (N25 81)   39  Sinusitis (473 9) (J32 9)   40  Vitamin D deficiency (268 9) (E55 9)    Past Medical History    1  History of Denial Of Any Significant Medical History   2  History of angina (V12 59) (Z86 79)   3  History of blood transfusion (V15 89) (Z92 89)   4  History of PTCA (V45 82) (Z98 61)   5  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)   6  History of Pulmonary edema (514) (J81 1)    Surgical History    1  History of CABG   2  CABG   3  History of Cataract Surgery   4  History of Cath Stent Placement   5  History of Hernia Repair   6  History of Surgery For Aneurysm   7  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    Family History  Mother    1  No pertinent family history  Spouse    2  Family history of cardiac disorder (V17 49) (Z82 49)  Family History    3  Family history of Cancer   4  Family history of Coronary Artery Disease (V17 49)   5  Family history of Diabetes Mellitus (V18 0)    Social History    · Caffeine use (V49 89) (F15 90)   · Former smoker (K46 25) (Y94 463)   · Marital History - Currently    · No drug use   · Stopped Drinking Alcohol    Current Meds   1   Advair Diskus 250-50 MCG/DOSE Inhalation Aerosol Powder Breath Activated; INHALE 1 PUFF   TWICE DAILY  RINSE MOUTH AFTER USE; Therapy: 32Jwl4721 to (Evaluate:87Yxq1021) Recorded   2  Albuterol Sulfate (2 5 MG/3ML) 0 083% Inhalation Nebulization Solution; USE 1 UNIT DOSE VIA   NEBULIZER  4 TIMES A DAY AS NEEDED Recorded   3  AmLODIPine Besylate 2 5 MG Oral Tablet; Take 1 tablet daily; Therapy: 77VEG9170 to (Evaluate:53Bnl0167)  Requested for: 23Ilq7579; Last Rx:27Vfo6115   Ordered   4  Aspirin Low Dose 81 MG TABS; Take 1 tablet daily Recorded   5  Atorvastatin Calcium 40 MG Oral Tablet; TAKE 1 TABLET DAILY AT BEDTIME; Therapy: 02CCR6744 to (Evaluate:05Jan2017)  Requested for: 95ELO8662; Last Rx:11Jan2016   Ordered   6  Centrum Silver Oral Tablet; Therapy: (Recorded:30Oct2014) to Recorded   7  Cinnamon TABS; take one tablet daily; Therapy: (Recorded:37Crt0659) to Recorded   8  Citalopram Hydrobromide 10 MG Oral Tablet; TAKE 1 TABLET AT BEDTIME; Therapy: 63LVG3907 to (Last Rx:87Cno4066)  Requested for: 82YJD6300 Ordered   9  Clopidogrel Bisulfate 75 MG Oral Tablet; Take 1 tablet daily; Therapy: 14ITD1856 to ([phone])  Requested for: 88ZNB5554; Last Rx:34Pop0777   Ordered   10  Daliresp 500 MCG Oral Tablet; TAKE 1 TABLET DAILY; Therapy: (Recorded:29Prw3303) to Recorded   11  FerrouSul 325 (65 Fe) MG Oral Tablet; TAKE 1 TABLET TWICE DAILY WITH MEALS; Therapy: 65PDH0717 to ([facility])  Requested for: 03OOY6589; Last Rx:25Vsa9949    Ordered   12  Fish Oil 1200 MG Oral Capsule; Therapy: (Recorded:29Oyb0716) to Recorded   13  Isosorbide Mononitrate ER 30 MG Oral Tablet Extended Release 24 Hour; TAKE 1 TABLET DAILY; Therapy: (Recorded:03Mar2016) to Recorded   14  Lantus SoloStar 100 UNIT/ML Subcutaneous Solution Pen-injector; Inject 35 units at bedtime as    directed by doctor; Therapy: (Recorded:36Fgq7907) to Recorded   15  Metoprolol Tartrate 50 MG Oral Tablet; take 1/2 tablet by mouth twice a dayl;     Therapy: 49EDC6769-(IPYHQWXS:75RBA2193)  Requested for: 26WTT5209 Ordered   16  Multi-betic Diabetes Oral Tablet; Therapy: (Recorded:20May2016) to Recorded   17  Nitrostat 0 4 MG Sublingual Tablet Sublingual; PLACE 1 TABLET UNDER THE TONGUE EVERY 5    MINUTES FOR UP TO 3 DOSES AS NEEDED FOR CHEST PAIN  CALL 911 IF PAIN PERSISTS; Therapy: 05XNT3984 to (Rei Son)  Requested for: 05Apr2016; Last Rx:05Apr2016    Ordered   18  NovoLOG FlexPen 100 UNIT/ML Subcutaneous Solution Pen-injector; imject 14 units before breakfast    and lunch and 14 before dinner; Therapy: 98SQX5053 to (Evaluate:16Nov2016)  Requested for: 51VYI6785; Last Rx:20May2016    Ordered   19  NovoLOG FlexPen 100 UNIT/ML Subcutaneous Solution Pen-injector; Inject 12 units @ breakfastk,    12u at lunch and 14u at supper as directed by your doctor; Therapy: (Recorded:20May2016) to Recorded   20  Pantoprazole Sodium 40 MG Oral Tablet Delayed Release; Take 1 tablet twice daily; Therapy: 26PFN1404 to (Evaluate:11May2017)  Requested for: 75CGO4681; Last Rx:16May2016    Ordered   21  Spiriva HandiHaler 18 MCG Inhalation Capsule; USE AS DIRECTED; Therapy: 64Inz1158 to Recorded   22  Tamsulosin HCl - 0 4 MG Oral Capsule; TAKE ONE CAPSULE BY MOUTH AT BEDTIME; Therapy: (Recorded:98Xzt2422) to Recorded   23  Terazosin HCl - 2 MG Oral Capsule; TAKE 2 CAPSULES AT BEDTIME; Therapy: 29VLW1280 to (26 058591)  Requested for: 36BIB8890; Last Rx:82Ufl5754 Ordered   24  Torsemide 20 MG Oral Tablet; Take 1 tablet daily; Therapy: 19Mtm5811-(Last:01Dec2015) Ordered   25  Vitamin B-12 TABS; 2,000 mcg daily; Therapy: (Recorded:19Jan2016) to Recorded   26  Vitamin D3 1000 UNIT Oral Tablet; TAKE 4 TABLET Daily; Last ER:85BFW1964 Ordered    Allergies    1  No Known Drug Allergies    2  No Known Environmental Allergies   3   No Known Food Allergies    Vitals  Vital Signs [Data Includes: Current Encounter]    Recorded: [id number] 09:45AM Temperature 97 F   Heart Rate 73   Respiration 18   Systolic 647   Diastolic 56   Height 5 ft 10 in   Weight 187 lb    BMI Calculated 26 83   BSA Calculated 2 03   O2 Saturation 98     Physical Exam    Constitutional   General appearance: Abnormal   Pleasant 49-year-old male who is awake alert  Eyes   Conjunctiva and lids: No swelling, erythema, or discharge  Pupils and irises: Equal, round and reactive to light  Ears, Nose, Mouth, and Throat   External inspection of ears and nose: Normal     Otoscopic examination: Tympanic membrance translucent with normal light reflex  Canals patent without erythema  Nasal mucosa, septum, and turbinates: Normal without edema or erythema  Oropharynx: Abnormal   Slightly erythematous posterior airway with a whitish postnasal drip  Pulmonary   Respiratory effort: No increased work of breathing or signs of respiratory distress  Some mild chronic shortness of breath with exertion  Auscultation of lungs: Abnormal   Decreased breath sounds and air flow with rhonchi heard both anterior and posteriorly which are faint, he states these improve after respiratory treatments  Cardiovascular   Palpation of heart: Normal PMI, no thrills  Auscultation of heart: Normal rate and rhythm, normal S1 and S2, without murmurs  Examination of extremities for edema and/or varicosities: Normal     Carotid pulses: Abnormal   Bilateral carotid bruits  Abdomen   Abdomen: Abnormal   Obese soft nontender with positive decreased bowel sounds Ã4 quadrants  Liver and spleen: No hepatomegaly or splenomegaly  Lymphatic   Palpation of lymph nodes in neck: No lymphadenopathy  Musculoskeletal   Gait and station: Abnormal   Antalgic gait walking with a boot on left foot for nonhealing ulcer  Digits and nails: Abnormal   Diffuse arthritic changes hands and digits     Inspection/palpation of joints, bones, and muscles: Abnormal   Diffuse osteoarthritis, tenderness and ecchymosis but no deformity to the right chest wall in the mid axillary line to the area of T6-T10  Skin   Skin and subcutaneous tissue: Normal without rashes or lesions  Neurologic   Cranial nerves: Cranial nerves 2-12 intact  Reflexes: Abnormal   Absent patellar and Achilles deep tendon reflexes  Sensation: Abnormal     Psychiatric   Orientation to person, place and time: Normal     Mood and affect: Abnormal   Flat but pleasant mood today  Diabetic Foot Screen: Abnormal        Future Appointments    Date/Time Provider Specialty Site   07/19/2016 09:30 AM Danielito Garza MD Neurology [facility]   09/01/2016 09:30 AM Nick Owens DO Internal Medicine [facility] INTERNAL MED   06/16/2016 09:00 AM JASSON Rousseau  Vascular Surgery [facility] NEUROSURGICAL   07/14/2016 03:00 PM JASSON Floyd   Nephrology [facility]   08/22/2016 09:45 AM Karen Zayas Endocrinology [facility] ENDOCRINOLOGY [facility]     Signatures   Electronically signed by : Tarun Nicole DO; May 24 2016 11:16AM EST                       (Author)

## 2018-01-15 NOTE — MISCELLANEOUS
History of Present Illness    The patient is being contacted for follow-up after hospitalization  He has a high risk for readmission  Topics counseled included diet, need for daily weights, medications, importance of compliance with treatment and symptoms to report  HF Additional Notes:   HFCC spoke with the patient's home health nurse, Emily Barros  The patient is on Pulmonary PALS but has had 2 Heart Failure admissions in 3 months  In discussion with Emily Barros the patient does weigh himself daily and is compliant with medications but is not compliant with a low sodium diet  HFCC will discuss possible diuretic protocol with Dr Bunny Benson that may be used by patient if weight goes up  The patient weight was down 8 #s after this last admission  Current Meds   1  Advair Diskus 250-50 MCG/DOSE Inhalation Aerosol Powder Breath Activated; INHALE 1   PUFF TWICE DAILY  RINSE MOUTH AFTER USE; Therapy: 25Tzb9078 to (Evaluate:16Feb2014) Recorded   2  Albuterol Sulfate (2 5 MG/3ML) 0 083% Inhalation Nebulization Solution; USE 1 UNIT   DOSE VIA NEBULIZER  4 TIMES A DAY AS NEEDED Recorded   3  AmLODIPine Besylate 2 5 MG Oral Tablet; Take 1 tablet daily; Therapy: 51RRQ9026 to (Evaluate:11Sep2017)  Requested for: 77Blc3553; Last   Rx:69Ynl8697 Ordered   4  AmLODIPine Besylate 2 5 MG Oral Tablet; TAKE 1 TABLET DAILY; Therapy: 54RJE6773 to (Evaluate:06Jun2017); Last Rx:59Ssu7209 Ordered   5  Aspirin Low Dose 81 MG TABS; Take 1 tablet daily Recorded   6  Atorvastatin Calcium 40 MG Oral Tablet; TAKE 1 TABLET DAILY AT BEDTIME; Therapy: 92CMA1043 to (Evaluate:05Jan2017)  Requested for: 68CZO8546; Last   Rx:11Jan2016 Ordered   7  BD Pen Needle Mae U/F 32G X 4 MM Miscellaneous; four times daily; Therapy: 98OXC5177 to (Evaluate:13Nov2016)  Requested for: 63Ovn0641; Last   Rx:94Krj3910 Ordered   8  Centrum Silver Oral Tablet; TAKE 1 TABLET DAILY; Therapy: (Recorded:97Dqw4586) to Recorded   9   Cinnamon 500 MG Oral Tablet; 4 tablets daily; Therapy: (Merna Scheuermann) to Recorded   10  Citalopram Hydrobromide 10 MG Oral Tablet; TAKE 1 TABLET AT BEDTIME; Therapy: 48FYI7694 to (Last WL:01ZEX6550)  Requested for: 59IRK4029 Ordered   11  Clopidogrel Bisulfate 75 MG Oral Tablet; Take 1 tablet daily; Therapy: 48ZTM4676 to (67 [phone])  Requested for: 96VLB7044; Last    Rx:34Tfj5463 Ordered   12  Daliresp 500 MCG Oral Tablet; TAKE 1 TABLET DAILY; Therapy: (Recorded:71Fpw6144) to Recorded   13  DrRx Keflex 500 MG #30; One by mouth twice a day to finish; Therapy: 79IDO0621 to (Last Rx:26Ahs7167) Ordered   14  FerrouSul 325 (65 Fe) MG Oral Tablet; TAKE 1 TABLET DAILY; Therapy: (Merna Scheuermann) to Recorded   15  Fish Oil 1200 MG Oral Capsule; three times daily; Therapy: (Merna Scheuermann) to Recorded   16  Isosorbide Mononitrate ER 30 MG Oral Tablet Extended Release 24 Hour; TAKE 1    TABLET DAILY; Therapy: (Recorded:03Mar2016) to Recorded   17  Lantus SoloStar 100 UNIT/ML Subcutaneous Solution Pen-injector; Inject 35 units at    bedtime as directed by doctor; Therapy: (Recorded:93Xht5819) to Recorded   18  Metoprolol Tartrate 50 MG Oral Tablet; take 1/2 tablet by mouth twice a dayl; Therapy: 28Mar2011-(Evaluate:09Apr2017)  Requested for: 78Byo6947 Ordered   19  Metoprolol Tartrate 50 MG Oral Tablet; take 1/2 tablet twice daily; Therapy: 32XCD1831 to (Evaluate:06Jun2017); Last Rx:88Scz6990 Ordered   20  Multi-betic Diabetes Oral Tablet; TAKE 1 TABLET DAILY; Therapy: (Merna Scheuermann) to Recorded   21  NovoLOG FlexPen 100 UNIT/ML Subcutaneous Solution Pen-injector; Inject 12 units @    breakfastk, 12u at lunch and 14u at supper as directed by your    doctor; Therapy: (Recorded:50Iip7100) to Recorded   22  Pantoprazole Sodium 40 MG Oral Tablet Delayed Release; Take 1 tablet twice daily; Therapy: 49DSC6635 to (Evaluate:44Wes2241)  Requested for: 43Uju5210; Last    Rx:28Raf9841 Ordered   23  Spiriva HandiHaler 18 MCG Inhalation Capsule; USE AS DIRECTED; Therapy: 73Bcg5643 to Recorded   24  Tamsulosin HCl - 0 4 MG Oral Capsule; TAKE ONE CAPSULE BY MOUTH AT BEDTIME; Therapy: (Recorded:95Jom2575) to Recorded   25  Terazosin HCl - 2 MG Oral Capsule; TAKE 2 CAPSULES AT BEDTIME; Therapy: 52NJG2910 to (Evaluate:41Loe3544)  Requested for: 48Xbn2199; Last    Rx:95Nii3799 Ordered   26  Torsemide 20 MG Oral Tablet; Take 1 tablet daily  Requested for: 93DXB8318; Last    Rx:79Zci3734 Ordered   27  Vitamin B-12 TABS; 2,000 mcg daily; Therapy: (Recorded:34Yjo2866) to Recorded   28  Vitamin D3 2000 UNIT Oral Capsule; 2 daily equals 4000 iu daily; Therapy: (Recorded:79Nka7311) to Recorded    Future Appointments    Date/Time Provider Specialty Site   01/27/2017 09:30 AM Gisela Pereira MD Neurology [facility]   11/28/2016 08:30 AM JASSON Yao  Endocrinology [facility]OCRINOLOGY [facility]   12/01/2016 09:00 AM Melisa Ely, DO Internal Medicine [facility] INTERNAL MED   11/04/2016 11:30 AM JASSON Ruth  Nephrology [address]   10/10/2016 09:20 AM Lisa Murray, [address] [place] [address] Cardiology [facility]   11/15/2016 03:00 PM Neida Snellen, M D  Cardiology [facility] CARDIOLOGY [place]     Allergies    1  No Known Drug Allergies    2  No Known Environmental Allergies   3   No Known Food Allergies    Signatures   Electronically signed by : Corrina Melara, ; Oct  5 2016  2:49PM EST                       (Author)

## 2018-01-16 NOTE — CONSULTS
Assessment    1  Benign essential hypertension (401 1) (I10)   2  CKD (chronic kidney disease), stage III (585 3) (N18 3)   3  DMII (diabetes mellitus, type 2) (250 00) (E11 9)   4  Chronic diastolic congestive heart failure (428 32,428 0) (I50 32)   5  Anemia (285 9) (D64 9)    Plan  Anemia, CKD (chronic kidney disease), stage III    · (1) FERRITIN; Status:Active; Requested SGW:[id number];    Perform:[facility]; HZX:[id number]; Ordered; For:Anemia, CKD (chronic kidney disease), stage III; Ordered By:Brody Alex;   · (1) IRON; Status:Active; Requested MXY:[id number];    Perform:[facility]; ZANE:84GMY0845; Ordered; For:Anemia, CKD (chronic kidney disease), stage III; Ordered By:Brody Alex;   · (1) TIBC; Status:Active; Requested OIX:[id number];    Perform:[facility]; RXK:[id number]; Ordered; For:Anemia, CKD (chronic kidney disease), stage III; Ordered By:Brody Alex;  Benign essential hypertension, Chronic kidney disease, Hyperkalemia    · Urine Dip Non-Automated- POC; Status:Resulted - Requires Verification,Retrospective  By Protocol Authorization;   Done: 12YCI2921 03:04PM   Performed: In Office; FUB:[id number]; Last Updated By:Alexia Linares; 2016 3:04:36 PM;Ordered; For:Benign essential hypertension, Chronic kidney disease, Hyperkalemia; Ordered By:Brody Alex;  CKD (chronic kidney disease), stage III    · (1) CBC/PLT/DIFF; Status:Active; Requested RXJ:[id number];    Perform:[facility]; [id number]; Ordered; For:CKD (chronic kidney disease), stage III; Ordered By:Brody Alex;   · (1) PTH N-TERMINAL (INTACT); Status:Active; Requested CXQ:[id number];    Perform:[facility]; PGY:32AVH1072; Ordered; For:CKD (chronic kidney disease), stage III; Ordered By:Brody Alex;   · (1) RENAL FUNCTION PANEL; Status:Active; Requested NLV:[id number];    Perform:[facility]; HYX:[id number]; Ordered;   For:CKD (chronic kidney disease), stage III; Ordered By:Brody Alex;   · (1) URINE PROTEIN CREATININE RATIO; Status:Active; Requested XXU:[id number];    Perform:[facility]; PKD:41PBY6673; Ordered; For:CKD (chronic kidney disease), stage III; Ordered By:Brody Alex;   · Follow-up visit in 6 months Evaluation and Treatment  Follow-up  Status: Hold For -  Scheduling  Requested for: 07GTP4192   Ordered; For: CKD (chronic kidney disease), stage III; Ordered By: Chris Cannon Performed:  Due: 90BJV3113   · Diabetes & Kidney Disease handout given today; Status:Complete;   Done: 78DTM3432   Ordered; For:CKD (chronic kidney disease), stage III; Ordered By:Brody Alex;   · Do not take aspirin or anti-inflammatory medicines ; Status:Complete;   Done:  41NMB3124   Ordered; For:CKD (chronic kidney disease), stage III; Ordered By:Brody Alex;   · Restrict your sodium (salt) intake to 2 grams per day ; Status:Complete;   Done:  44ADE7379   Ordered; For:CKD (chronic kidney disease), stage III; Ordered By:Carnero, Claudell Ice;   · Understanding CKD handout given today; Status:Complete;   Done: 22HBY0905   Ordered; For:CKD (chronic kidney disease), stage III; Ordered By:Carnero, Claudell Ice;    Discussion/Summary    This is a 80years old gentleman with CKD stage 3B (creatinine between 1 3 to 1 8), DM, HTN, CAD s/p CABG, chronic diastolic CHF with multiple admission due to CHF exacerbation, PAD presents for evaluation of CKD    1  Chronic kidney disease stage 3B - multifactorial - 2/2 diabetes nephropathy  atherosclerotic disease, cardiorenal syndrome  Most recent creatinine 1 39 last week  Advise to avoid NSAIDs  Repeat labs in 6 months with CKD workup (iron studies, PTH, phosphorus and UPC)  2  CAD s/p CABG, diastolic CHF with multiple admission -   reinforced about importance of low salt diet (diet info provided)   continue same diuretics    3  HTN - noted big differential between arms    4   Anemia - check iron studies in 6 months    5  Mineral bone disease - check phosphorus and PTH before next appointment      The patient, patient's family was counseled regarding diagnostic results, instructions for management, risk factor reductions, risks and benefits of treatment options, importance of compliance with treatment  He has no barriers to learning  Indication for Services: CKD Stage 3  CKD Teaching includes hypertension management, Avoid nephrotoxic medication, sodium restriction and fluid management  Current Patient Status: no bone mineral disease, anemia and no worsening of renal function  Discussed with the patient Discussed with the patient's family      Reason For Visit  Referred for evaluation of CKD      History of Present Illness  This is a 80years old gentleman with CKD stage 3B (creatinine 1 3 to 1 8), DM for over 30 years with neuropathy and retinopathy, CAD s/p CABG x4, chronic congestive CHF with multiple admission over last year due to decompensated CHF, chronic PAFIB, PAD with brain aneurysm and bilateral carotid disease referred for evaluation of CKD  Today presents to the office with his wife  In general is doing OK, weight is stable at home, leg edema is stable, has chronic MILLER, no CP, no orthopnea, no urinary problems  No abdominal pain, no N/V/D  chronic constipated  Takes Advil as needed for pain (once a month)  No family history of kidney disease  No history of kidney stones  Tobacco - quit 30 years ago  Alcohol - denies  Review of Systems    Constitutional: no fever, no chills, no fatigue, not feeling poorly, not feeling tired, no anorexia, no recent weight gain and no recent weight loss  Eyes: no eyesight problems and no dryness of the eyes  ENT: no hearing loss and no nasal discharge  Cardiovascular: no orthopnea, no PND, no chest pain, no palpitations, no intermittent leg claudication and no lower extremity edema     Respiratory: shortness of breath during exertion, but no shortness of breath, no wheezing, no cough and not coughing up sputum  Gastrointestinal: constipation, but no abdominal pain, no nausea, no diarrhea and no vomiting  Genitourinary: no dysuria, no incontinence, no hematuria and does not have slow stream    Musculoskeletal: no arthralgias, no joint pain and no back pain  Integumentary: no rashes and no skin lesions  Neurological: no headache, no confusion, no lightheadedness and no dizziness  Hematologic/Lymphatic: no swollen glands, no tendency for easy bleeding and no tendency for easy bruising  Active Problems    1  Abnormal weight gain (783 1) (R63 5)   2  Accidental fall (E888 9) (W19 XXXA)   3  Acute bronchitis (466 0) (J20 9)   4  Acute Non-Q-wave Myocardial Infarction (410 70)   5  Ambulatory dysfunction (719 7) (R26 2)   6  Anemia (285 9) (D64 9)   7  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)   8  Angina pectoris (413 9) (I20 9)   9  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,440 9) (I25 10)   10  Asymptomatic carotid artery narrowing without infarction (433 10) (I65 29)   11  Atrial fibrillation (427 31) (I48 91)   12  Benign essential hypertension (401 1) (I10)   13  Bilateral leg edema (782 3) (R60 0)   14  Black tarry stools (578 1) (K92 1)   15  CABG   16  Cellulitis (On Exam)   17  Chest pain (786 50) (R07 9)   18  Chronic diastolic congestive heart failure (428 32,428 0) (I50 32)   19  Chronic kidney disease (585 9) (N18 9)   20  Chronic obstructive pulmonary disease (496) (J44 9)   21  Constipation, chronic (564 00) (K59 09)   22  Depression (311) (F32 9)   23  Diabetic neuropathy, type II diabetes mellitus (250 60,357 2) (E11 40)   24  DMII (diabetes mellitus, type 2) (250 00) (E11 9)   25  Drug eruption (693 0) (L27 0)   26  Dysphagia (787 20) (R13 10)   27  Dyspnea (786 09) (R06 00)   28  Edema (782 3) (R60 9)   29  GERD (gastroesophageal reflux disease) (530 81) (K21 9)   30   Hyperkalemia (276 7) (E87 5)   31  Hyperlipidemia (272 4) (E78 5)   32  Hypoglycemia (251 2) (E16 2)   33  Microalbuminuria (791 0) (R80 9)   34  Neuropathy (355 9) (G62 9)   35  Non-healing open wound of right groin (879 5) (S31 103A)   36  Non-proliferative diabetic retinopathy (250 50,362 03) (E11 329)   37  Peripheral vascular disease (443 9) (I73 9)   38  Postoperative state (V45 89) (Z98 89)   39  Prostate disorder (602 9) (N42 9)   40  Puncture Wound Of Foot (892 0)   41  Sacral decubitus ulcer, stage II (033 45,917 15) (L89 152)   42  Secondary hyperparathyroidism (588 81) (N25 81)   43  Sinusitis (473 9) (J32 9)   44  Skin ulcer, chronic (707 9) (L98 499)   45  Type 2 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 (250 80,707 15) (E11 621,L97 529)   46  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)   47  Vitamin D deficiency (268 9) (E55 9)    Past Medical History    1  History of Denial Of Any Significant Medical History   2  History of angina (V12 59) (Z86 79)   3  History of blood transfusion (V15 89) (Z92 89)   4  History of PTCA (V45 82) (Z98 61)   5  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)   6  History of Pulmonary edema (514) (J81 1)    The active problems and past medical history were reviewed and updated today  Surgical History    1  History of CABG   2  CABG   3  History of Cataract Surgery   4  History of Cath Stent Placement   5  History of Hernia Repair   6  History of Surgery For Aneurysm   7  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    The surgical history was reviewed and updated today  Family History  Mother    1  Family history of Diabetes Mellitus (V18 0)  Sister    2  Family history of malignant neoplasm (V16 9) (Z80 9)  Spouse    3  Family history of cardiac disorder (V17 49) (Z82 49)  Family History    4  Family history of Cancer   5  Family history of Coronary Artery Disease (V17 49)   6   Family history of Diabetes Mellitus (V18 0)    The family history was reviewed and updated today  Social History    · Denied: History of Always uses seat belt   · Caffeine use (V49 89) (F15 90)   · Daily caffeine consumption, 1 serving a day   · Does not exercise (V69 0) (Z72 3)   · Former smoker (V15 82) (R83 207)   · Marital History - Currently    · No drug use   · Stopped Drinking Alcohol  The social history was reviewed and updated today  Current Meds   1  Advair Diskus 250-50 MCG/DOSE Inhalation Aerosol Powder Breath Activated; INHALE 1   PUFF TWICE DAILY  RINSE MOUTH AFTER USE; Therapy: 35Cby2918 to (Evaluate:40Hhh1358) Recorded   2  Albuterol Sulfate (2 5 MG/3ML) 0 083% Inhalation Nebulization Solution; USE 1 UNIT   DOSE VIA NEBULIZER  4 TIMES A DAY AS NEEDED Recorded   3  AmLODIPine Besylate 2 5 MG Oral Tablet; Take 1 tablet daily; Therapy: 56WDS7526 to (Evaluate:92Ugt7200)  Requested for: 13Fzz7553; Last   Rx:73Yaz9918 Ordered   4  Aspirin Low Dose 81 MG TABS; Take 1 tablet daily Recorded   5  Atorvastatin Calcium 40 MG Oral Tablet; TAKE 1 TABLET DAILY AT BEDTIME; Therapy: 73BXW0424 to (Evaluate:05Jan2017)  Requested for: 51EVM5635; Last   Rx:11Jan2016 Ordered   6  Centrum Silver Oral Tablet; TAKE 1 TABLET DAILY; Therapy: (Recorded:68Blm1302) to Recorded   7  Cinnamon TABS; take one tablet daily; Therapy: (Recorded:66Vag2294) to Recorded   8  Citalopram Hydrobromide 10 MG Oral Tablet; TAKE 1 TABLET AT BEDTIME; Therapy: 88CCD7348 to (Last WOJCIECH:70JGD9968)  Requested for: 57JLH3684 Ordered   9  Clopidogrel Bisulfate 75 MG Oral Tablet; Take 1 tablet daily; Therapy: 97WBH0691 to (Dorene Phyllis)  Requested for: 30IVN0293; Last   Rx:58Sel7883 Ordered   10  Daliresp 500 MCG Oral Tablet; TAKE 1 TABLET DAILY; Therapy: (Recorded:25Vtp9134) to Recorded   11  FerrouSul 325 (65 Fe) MG Oral Tablet; TAKE 1 TABLET TWICE DAILY WITH MEALS; Therapy: 52ADV1087 to (Claudeen Rosales)  Requested for: 76EFY6772; Last    Rx:65Uxx8957 Ordered   12   Fish Oil 1200 MG Oral Capsule; Therapy: (Recorded:30Dxy6469) to Recorded   13  Isosorbide Mononitrate ER 30 MG Oral Tablet Extended Release 24 Hour; TAKE 1    TABLET DAILY; Therapy: (Recorded:2016) to Recorded   14  Lantus SoloStar 100 UNIT/ML Subcutaneous Solution Pen-injector; Inject 35 units at    bedtime as directed by doctor; Therapy: (Recorded:43Who5718) to Recorded   15  Metoprolol Tartrate 50 MG Oral Tablet; take 1/2 tablet by mouth twice a dayl; Therapy: 2011-(Evaluate:2017)  Requested for: 77Udp4801 Ordered   16  Multi-betic Diabetes Oral Tablet; Therapy: (Recorded:2016) to Recorded   17  Nitrostat 0 4 MG Sublingual Tablet Sublingual; PLACE 1 TABLET UNDER THE TONGUE    EVERY 5 MINUTES FOR UP TO 3 DOSES AS NEEDED FOR CHEST PAIN  CALL    911 IF PAIN PERSISTS; Therapy: 97UXS9571 to (Rhina Colace)  Requested for: 2016; Last    Rx:2016 Ordered   18  NovoLOG FlexPen 100 UNIT/ML Subcutaneous Solution Pen-injector; imject 14 units    before breakfast and lunch and 14 before dinner; Therapy: 70SFQ5937 to (Jeremiah Sabattus)  Requested for: 73FRD9879; Last    Rx:2016 Ordered   19  NovoLOG FlexPen 100 UNIT/ML Subcutaneous Solution Pen-injector; Inject 12 units @    breakfastk, 12u at lunch and 14u at supper as directed by your    doctor; Therapy: (Recorded:2016) to Recorded   20  Pantoprazole Sodium 40 MG Oral Tablet Delayed Release; Take 1 tablet twice daily; Therapy: 63BDI8179 to (Evaluate:16Wdd9074)  Requested for: 36Odp5100; Last    Rx:43Tqf3034 Ordered   21  Spiriva HandiHaler 18 MCG Inhalation Capsule; USE AS DIRECTED; Therapy: 87Hsq3272 to Recorded   22  Tamsulosin HCl - 0 4 MG Oral Capsule; TAKE ONE CAPSULE BY MOUTH AT BEDTIME; Therapy: (Recorded:87Vzg2964) to Recorded   23  Terazosin HCl - 2 MG Oral Capsule; TAKE 2 CAPSULES AT BEDTIME; Therapy: 42PEH3975 to (Nevelyn Clear)  Requested for: 87WOZ7706;  Last    C53NJO9179 Ordered   24  Torsemide 20 MG Oral Tablet; TAKE 1 TABLET DAILY; Therapy: (Recorded:34Zzs3671) to  Requested for: 85KUS8571 Recorded   25  Vitamin B-12 TABS; 2,000 mcg daily; Therapy: (Recorded:58Ogs9229) to Recorded   26  Vitamin D3 1000 UNIT Oral Tablet; TAKE 4 TABLET Daily; Last CX:79MBD4351 Ordered    The medication list was reviewed and updated today  Allergies    1  No Known Drug Allergies    2  No Known Environmental Allergies   3  No Known Food Allergies    Vitals  Vital Signs [Data Includes: Current Encounter]    Recorded: [id number] 03:46PM Recorded: 35Pol4244 03:08PM   Heart Rate 64     Systolic 96, RUE, Sitting 125, LUE, Sitting    Diastolic 60, RUE, Sitting 65, LUE, Sitting    Height   5 ft 9 in   Weight   191 lb 8 0 oz   BMI Calculated   28 28   BSA Calculated   2 03     Physical Exam    Constitutional: General appearance: No acute distress, well appearing and well nourished  ENT: External ears and nose appear normal      Eyes: Anicteric sclerae  JVD:  No JVD present  Pulmonary:  Auscultation of lungs: Clear to auscultation  Cardiovascular: Auscultation of heart: Abnormal   The heart rate was normal  The rhythm was irregularly irregular  Abdomen: Non-tender, no masses  Extremities: Extremities are abnormal   Extremities: bilateral extremities have trace pitting edema  Rash: No rash present  Neurologic: Non Focal      Psychiatric: Orientation to person, place, and time: Normal   and Mood and affect: Normal     Back: No CVA tenderness        Results/Data  2 WEEKS Results   Urine Dip Non-Automated- POC 92AZV3752 03:04PM Rubina Funez     Test Name Result Flag Reference   Color Yellow     Clarity Transparent     Leukocytes -     Nitrite -     Blood -     Bilirubin -     Urobilinogen -     Protein trace     Ph 5 0     Specific Gravity 1 010     Ketone -     Glucose -     Color Yellow     Clarity Transparent     Leukocytes -     Nitrite -     Blood -     Bilirubin - Urobilinogen -     Protein trace     Ph 5 0     Specific Gravity 1 010     Ketone -     Glucose -             (1) BASIC METABOLIC PROFILE 06TQV4588 07:34PM Javan Mais     Test Name Result Flag Reference   GLUCOSE,RANDM 104 mg/dL     If the patient is fasting, the ADA then defines impaired fasting glucose as > 100 mg/dL and diabetes as > or equal to 123 mg/dL  SODIUM 139 mmol/L  136-145   POTASSIUM 4 2 mmol/L  3 5-5 3   This specimen shows spectrophotometric evidence of slight hemolysis that may affect the accuracy of measurement of K+, MG, AST, LD, and IRON  Interpret results with caution and correlate with clinical findings  Repeat specimen recommended if clinically warranted  CHLORIDE 100 mmol/L  100-108   CARBON DIOXIDE 29 mmol/L  21-32   ANION GAP (CALC) 10 mmol/L  4-13   BLOOD UREA NITROGEN 34 mg/dL H 5-25   CREATININE 1 39 mg/dL H 0 60-1 30   Standardized to IDMS reference method   CALCIUM 8 7 mg/dL  8 3-10 1   eGFR Non-African American 48 9 ml/min/1 73sq [facility] Disease Education Program recommendations are as follows:  GFR calculation is accurate only with a steady state creatinine  Chronic Kidney disease less than 60 ml/min/1 73 sq  meters  Kidney failure less than 15 ml/min/1 73 sq  meters  I have reviewed the office records as summarized above in the HPI  Results   (1) BASIC METABOLIC PROFILE 95ZIU6450 07:34PM Javan Mais     Test Name Result Flag Reference   GLUCOSE,RANDM 104 mg/dL     If the patient is fasting, the ADA then defines impaired fasting glucose as > 100 mg/dL and diabetes as > or equal to 123 mg/dL  SODIUM 139 mmol/L  136-145   POTASSIUM 4 2 mmol/L  3 5-5 3   This specimen shows spectrophotometric evidence of slight hemolysis that may affect the accuracy of measurement of K+, MG, AST, LD, and IRON  Interpret results with caution and correlate with clinical findings  Repeat specimen recommended if clinically warranted     CHLORIDE 100 mmol/L  100-108 CARBON DIOXIDE 29 mmol/L  21-32   ANION GAP (CALC) 10 mmol/L  4-13   BLOOD UREA NITROGEN 34 mg/dL H 5-25   CREATININE 1 39 mg/dL H 0 60-1 30   Standardized to IDMS reference method   CALCIUM 8 7 mg/dL  8 3-10 1   eGFR Non-African American 48 9 ml/min/1 73sq m     [facility] Disease Education Program recommendations are as follows:  GFR calculation is accurate only with a steady state creatinine  Chronic Kidney disease less than 60 ml/min/1 73 sq  meters  Kidney failure less than 15 ml/min/1 73 sq  meters       Future Appointments    Date/Time Provider Specialty Site   09/01/2016 09:30 AM Jen Camejo, DO Internal Medicine [facility] INTERNAL MED   08/22/2016 09:45 AM Wei Phillips Endocrinology [facility] ENDOCRINOLOGY [facility]   07/19/2016 09:30 AM Annie Matute [facility] Neurology [facility] [address]     Signatures   Electronically signed by : JASSON Moss ; Jul 14 2016  3:55PM EST                       (Author)

## 2018-01-24 NOTE — PROGRESS NOTES
Assessment    1  Atrial fibrillation (427 31) (I48 91)   2  Benign essential hypertension (401 1) (I10)   3  CABG   4  Chronic kidney disease (585 9) (N18 9)   5  DMII (diabetes mellitus, type 2) (250 00) (E11 9)   6  Hyperlipidemia (272 4) (E78 5)   7  Non-proliferative diabetic retinopathy (250 50,362 03) (E11 329)   8  Peripheral vascular disease (443 9) (I73 9)   9  Vitamin D deficiency (268 9) (E55 9)   10  Chronic diastolic congestive heart failure (428 32,428 0) (I50 32)   11  Chronic obstructive pulmonary disease (496) (J44 9)   12  Secondary hyperparathyroidism (588 81) (N25 81)            81 yo male with several comorbidities and long-standing complicated type 2 diabetes mellitus, fair control on multiple dose injection therapy, last A1c of  8% worse from last time comes for follow-up visit    1  Type 2 diabetes mellitus: hx of CVD, CKD III and COPD  -Blood glucose logs review blood sugar between   -  The numbers are variable to good variable carb intake and activity level  -He would not want to see a dietitian at this time  -He should eat a meal or snack during daytime to prevent hypoglycemia  -cont special diabetic shoes for his neuropathy and foot ulcers    2  CVD, CKD, HTN, secondary hyperpara:  -lipids are good, cont aspirin, statins, beta blockers, blood pressure is controlled  Follow-up with cardiology, Podiatry, Pulmonology, ophthalmology, and nephrology  3  Vit D def: level is 31  increase from 2000 to 4000 units of vitamin D level   -check labs before next visit     Plan  Benign essential hypertension, Chronic diastolic congestive heart failure, Chronic kidney  disease, Chronic obstructive pulmonary disease, DMII (diabetes mellitus, type 2),  Hyperlipidemia, Non-proliferative diabetic retinopathy, Peripheral vascular disease,  Vitamin D deficiency    · (1) HEMOGLOBIN A1C; Status:Active; Requested for:47Bbu0136;    Perform:[facility]; FTI:29FVE9947;VCKTEAG;   For:Benign essential hypertension, Chronic diastolic congestive heart failure, Chronic kidney disease, Chronic obstructive pulmonary disease, DMII (diabetes mellitus, type 2), Hyperlipidemia, Non-proliferative diabetic retinopathy, Peripheral vascular disease, Vitamin D deficiency; Ordered By:Lester Kay;   · (1) PTH N-TERMINAL (INTACT); Status:Active; Requested for:63Xir6483;    Perform:[facility]; BCU:52QEC5780;CNTAZGY; For:Benign essential hypertension, Chronic diastolic congestive heart failure, Chronic kidney disease, Chronic obstructive pulmonary disease, DMII (diabetes mellitus, type 2), Hyperlipidemia, Non-proliferative diabetic retinopathy, Peripheral vascular disease, Vitamin D deficiency; Ordered By:Lester Kay;   · (1) RENAL FUNCTION PANEL; Status:Active; Requested for:71Lyo3072;    Perform:[facility]; ERT:13FAN5092;SZOHVNF; For:Benign essential hypertension, Chronic diastolic congestive heart failure, Chronic kidney disease, Chronic obstructive pulmonary disease, DMII (diabetes mellitus, type 2), Hyperlipidemia, Non-proliferative diabetic retinopathy, Peripheral vascular disease, Vitamin D deficiency; Ordered By:Lester Kay;   · (1) VITAMIN D 25-HYDROXY; Status:Active; Requested for:70Kge2443;    Perform:[facility]; HFD:39YIQ7399;IXGNDYM; For:Benign essential hypertension, Chronic diastolic congestive heart failure, Chronic kidney disease, Chronic obstructive pulmonary disease, DMII (diabetes mellitus, type 2), Hyperlipidemia, Non-proliferative diabetic retinopathy, Peripheral vascular disease, Vitamin D deficiency; Ordered By:Lester Kay;   · Follow-up visit in 3 months Evaluation and Treatment  Follow-up  Status: Hold For -  Scheduling  Requested for: 78JFL7327   Ordered;  For: Benign essential hypertension, Chronic diastolic congestive heart failure, Chronic kidney disease, Chronic obstructive pulmonary disease, DMII (diabetes mellitus, type 2), Hyperlipidemia, Non-proliferative diabetic retinopathy, Peripheral vascular disease, Vitamin D deficiency; Ordered By: Nilson Lanier Performed:  Due: 28YZR3637   · Follow-Up With Advanced Practitioner Evaluation and Treatment  Follow-up  Status: Hold  For - Scheduling  Requested for: 09KFZ6348   Ordered; For: Benign essential hypertension, Chronic diastolic congestive heart failure, Chronic kidney disease, Chronic obstructive pulmonary disease, DMII (diabetes mellitus, type 2), Hyperlipidemia, Non-proliferative diabetic retinopathy, Peripheral vascular disease, Vitamin D deficiency; Ordered By: Nilson Lanier Performed:  Due: 59SJL3944  DMII (diabetes mellitus, type 2)    · Lantus SoloStar 100 UNIT/ML Subcutaneous Solution Pen-injector   Rx By: Nilson Lanier; Dispense: 90 Days ; #:4 X 3 ML Pen (5 Pens); Refill: 3; For: DMII (diabetes mellitus, type 2); ESTEFANÍA = N; Verified Transmission to [facility]); Last Updated By: Eden Morgan; 5/20/2016 1:17:33 PM   · NovoLOG FlexPen 100 UNIT/ML Subcutaneous Solution Pen-injector; imject 14  units before breakfast and lunch and 14 before dinner   Rx By: Nilson Lanier; Dispense: 90 Days ; #:2 X 3 ML Pen (5 Pens); Refill: 1; For: DMII (diabetes mellitus, type 2); ESTEFANÍA = N; Verified Transmission to [facility]); Last Updated By: System, [employer]; 5/20/2016 1:22:10 PM  Vitamin D deficiency    · From  Vitamin D3 1000 UNIT Oral Tablet  To Vitamin D3 1000 UNIT Oral Tablet  TAKE 4 TABLET Daily   Rx By: Nilson Lanier; Dispense: 0 Days ; #:90 Tablet; Refill: 0; For: Vitamin D deficiency; ESTEFANÍA = N; Record    Discussion/Summary  Discussion Summary:   Decrease lantus to 30 units at betime  Counseling Documentation With Imm: The patient was counseled regarding diagnostic results, instructions for management, risk factor reductions, impressions, risks and benefits of treatment options, importance of compliance with treatment   total time of encounter was 34 minutes and 23 minutes was spent counseling  Medication SE Review and Pt Understands Tx: Possible side effects of new medications were reviewed with the patient/guardian today  The treatment plan was reviewed with the patient/guardian  The patient/guardian understands and agrees with the treatment plan      Chief Complaint  Chief Complaint Free Text Note Form: Follow up   Chief Complaint Chronic Condition [facility]: Patient is here today for follow up of chronic conditions described in HPI  History of Present Illness  HPI: ding well, no new complaints,  beathign remains an issue   Diabetes: The patient is being seen for routine follow-up of Diabetes Mellitus 2  The HbA1c was   See Medication List for current medication(s)  Source of information reported by the patient and indicates that the patient checks his blood sugar four times per day  No particular diet followed  His exercise regimen is composed of fewer than three times a week  Diabetes education not performed   By report, there is fair compliance with treatment, fair tolerance of treatment and fair symptom control  Current pertinent lifestyle factors include obesity, disordered eating and inactivity  The patient is currently asymptomatic   Disease Course and Complications:  there have been no previous episodes of diabetic ketoacidosis  there have been no previous hospitalizations  The last dilated fundus exam showed mild nonproliferative retinopathy  Cardiovascular: coronary artery disease  Renal: nephropathy  Musculoskeletal / Integumentary / Circulatory: neuropathic arthropathy of the  and foot ulcers ()  Other complications: hypoglycemic episodes  The patient reports hypoglycemia symptoms 1 times a week  Hypoglycemia symptoms include anxiety, confusion, dizziness and hunger  Vitamin D Deficiency: The patient is being seen for follow-up of vitamin D deficiency  Disease type: vitamin D deficiency  Recent laboratory results: date, 25-hydroxyvitamin D ng/mL  Current treatment includes vitamin D3 (cholecalciferol)  The patient is currently asymptomatic  Review of Systems  Endo Adult ROS Male Established v2 - [facility]:   Constitutional/General: no recent weight gain, no recent weight loss, no poor energy/fatigue, increased energy level, insomnia/sleep problems, no fever and no feeling weak  Heart: high blood pressure, but no chest pain/tightness, no rapid/racing heart rate and no palpitations  Genitourinary - Urinary frequent urination, excess urination and urinating during the night  Eyes: no blurred vision, no double vision, no bulging eyes, no gritty/scratchy eyes and no excessive tearing  Mouth / Throat: no hoarseness and no difficulty swallowing  Neck: no lumps, no swollen glands, no neck pain, no neck stiffness and no enlarged thyroid  Respiratory: wheezing, no asthma and no persistent cough  Musculoskeletal: no muscle aches/pain, no joint aches/pain and no muscle weakness  Skin & Hair: no dry skin, no acne, the hair texture was not oily, no hair loss and no excessive hair growth  Gastrointestinal: constipation, no diarrhea, no waking at night to drink and no stomach ache  Neurological: no blackouts, no weakness and no tremors  Genital: no testicular pain, no testicular lumps/bumps/mass and performs monthly testicular exam    Endocrine: no feeling hot frequently, no feeling cold frequently, no shifts between feeling hot and cold, no cold hands or feet, no excessive sweating, no thyroid problems, no blood sugar problems, no excessive thirst, no excessive hunger, no change in shoe size, no nausea or vomiting and no shaky hands  ROS Reviewed:   ROS reviewed  Active Problems    1  Accidental fall (E888 9) (W19 XXXA)   2  Acute bronchitis (466 0) (J20 9)   3  Acute Non-Q-wave Myocardial Infarction (410 70)   4  Anemia (285 9) (D64 9)   5  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)   6  Angina pectoris (413 9) (I20 9)   7  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,440 9) (I25 10)   8  Asymptomatic carotid artery narrowing without infarction (433 10) (I65 29)   9  Atrial fibrillation (427 31) (I48 91)   10  Benign essential hypertension (401 1) (I10)   11  Black tarry stools (578 1) (K92 1)   12  CABG   13  Chest pain (786 50) (R07 9)   14  Chronic diastolic congestive heart failure (428 32,428 0) (I50 32)   15  Chronic kidney disease (585 9) (N18 9)   16  Chronic obstructive pulmonary disease (496) (J44 9)   17  Constipation, chronic (564 00) (K59 09)   18  Depression (311) (F32 9)   19  DMII (diabetes mellitus, type 2) (250 00) (E11 9)   20  Drug eruption (693 0) (L27 0)   21  Dysphagia (787 20) (R13 10)   22  Dyspnea (786 09) (R06 00)   23  Edema (782 3) (R60 9)   24  GERD (gastroesophageal reflux disease) (530 81) (K21 9)   25  Hyperkalemia (276 7) (E87 5)   26  Hyperlipidemia (272 4) (E78 5)   27  Hypoglycemia (251 2) (E16 2)   28  Microalbuminuria (791 0) (R80 9)   29  Neuropathy (355 9) (G62 9)   30  Non-healing open wound of right groin (879 5) (S31 103A)   31  Non-proliferative diabetic retinopathy (250 50,362 03) (E11 329)   32  Peripheral vascular disease (443 9) (I73 9)   33  Postoperative state (V45 89) (Z98 89)   34  Prostate disorder (602 9) (N42 9)   35  Puncture Wound Of Foot (892 0)   36  Sacral decubitus ulcer, stage II (348 98,608 45) (L89 152)   37  Sinusitis (473 9) (J32 9)   38  Vitamin D deficiency (268 9) (E55 9)    Past Medical History    1  History of Denial Of Any Significant Medical History   2  History of angina (V12 59) (Z86 79)   3  History of blood transfusion (V15 89) (Z92 89)   4  History of PTCA (V45 82) (Z98 61)   5  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)   6  History of Pulmonary edema (514) (J81 1)  Active Problems And Past Medical History Reviewed: The active problems and past medical history were reviewed and updated today  Surgical History    1  History of CABG   2  CABG   3  History of Cataract Surgery   4  History of Cath Stent Placement   5  History of Hernia Repair   6  History of Surgery For Aneurysm   7  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  Surgical History Reviewed: The surgical history was reviewed and updated today  Family History  Mother    1  No pertinent family history  Spouse    2  Family history of cardiac disorder (V17 49) (Z82 49)  Family History    3  Family history of Cancer   4  Family history of Coronary Artery Disease (V17 49)   5  Family history of Diabetes Mellitus (V18 0)  Family History Reviewed: The family history was reviewed and updated today  Social History    · Caffeine use (V49 89) (F15 90)   · Former smoker (V15 82) (W65 639)   · Marital History - Currently    · No drug use   · Stopped Drinking Alcohol  Social History Reviewed: The social history was reviewed and updated today  Current Meds   1  Advair Diskus 250-50 MCG/DOSE Inhalation Aerosol Powder Breath Activated; INHALE 1   PUFF TWICE DAILY  RINSE MOUTH AFTER USE; Therapy: 73Jzh3131 to (Evaluate:20Opg4458) Recorded   2  Albuterol Sulfate (2 5 MG/3ML) 0 083% Inhalation Nebulization Solution; USE 1 UNIT   DOSE VIA NEBULIZER  4 TIMES A DAY AS NEEDED Recorded   3  AmLODIPine Besylate 2 5 MG Oral Tablet; Take 1 tablet daily; Therapy: 82UHE2805 to (Evaluate:67Efd3878)  Requested for: 04Gpa1153; Last   Rx:84Hff7324 Ordered   4  Aspirin Low Dose 81 MG TABS; Take 1 tablet daily Recorded   5  Atorvastatin Calcium 40 MG Oral Tablet; TAKE 1 TABLET DAILY AT BEDTIME; Therapy: 77IZX5476 to (Evaluate:05Jan2017)  Requested for: 58QFH3700; Last   Rx:11Jan2016 Ordered   6  Centrum Silver Oral Tablet; Therapy: (Recorded:30Oct2014) to Recorded   7  Cinnamon TABS; take one tablet daily; Therapy: (Recorded:20Xnb5355) to Recorded   8  Citalopram Hydrobromide 10 MG Oral Tablet; TAKE 1 TABLET AT BEDTIME;    Therapy: 49NWV8456 to (Last Rx:14Pte2068)  Requested for: 68YOE5095 Ordered   9  Clopidogrel Bisulfate 75 MG Oral Tablet; Take 1 tablet daily; Therapy: 58DZC2954 to (Rosa M Antony)  Requested for: 23BFO3052; Last   Rx:52Yxr5496 Ordered   10  Daliresp 500 MCG Oral Tablet; TAKE 1 TABLET DAILY; Therapy: (Recorded:68Hnq2654) to Recorded   11  FerrouSul 325 (65 Fe) MG Oral Tablet; TAKE 1 TABLET TWICE DAILY WITH MEALS; Therapy: 84WNR1392 to (Hayes Anderson)  Requested for: 68FIT7264; Last    Rx:72Kxa4547 Ordered   12  Fish Oil 1200 MG Oral Capsule; Therapy: (Recorded:06Mnz0505) to Recorded   13  Fluticasone Propionate 50 MCG/ACT Nasal Suspension; USE 1 SPRAY IN EACH    NOSTRIL TWICE DAILY; Therapy: 14YSQ4193 to (Last Rx:17Mar2016)  Requested for: 64LER7635 Ordered   14  Isosorbide Mononitrate ER 30 MG Oral Tablet Extended Release 24 Hour; TAKE 1    TABLET DAILY; Therapy: (Recorded:03Mar2016) to Recorded   15  Lantus SoloStar 100 UNIT/ML Subcutaneous Solution Pen-injector; imject 32 units twice    daily; Therapy: 08KPN8315 to (Meri Burkitt)  Requested for: 64KFT0190; Last    Rx:07Mar2016 Ordered   16  Lantus SoloStar 100 UNIT/ML Subcutaneous Solution Pen-injector; Inject 35 units at    bedtime as directed by doctor; Therapy: (Recorded:10Urw5508) to Recorded   17  Metoprolol Tartrate 50 MG Oral Tablet; take 1/2 tablet by mouth twice a dayl; Therapy: 28Mar2011-(Evaluate:09Apr2017)  Requested for: 14Apr2016 Ordered   18  Multi-betic Diabetes Oral Tablet; Therapy: (Recorded:75Oxs7102) to Recorded   19  Nitrostat 0 4 MG Sublingual Tablet Sublingual; PLACE 1 TABLET UNDER THE TONGUE    EVERY 5 MINUTES FOR UP TO 3 DOSES AS NEEDED FOR CHEST PAIN  CALL    911 IF PAIN PERSISTS; Therapy: 88UTJ1065 to (Meri Burkitt)  Requested for: 05Apr2016; Last    Rx:05Apr2016 Ordered   20  NovoLOG FlexPen 100 UNIT/ML Subcutaneous Solution Pen-injector; imject 10 units    before breakfast and lunch and 14 before dinner;     Therapy: 30VXF3028 to (Evaluate:05Jun2016)  Requested for: 29RWA1611; Last    Rx:07Mar2016 Ordered   21  NovoLOG FlexPen 100 UNIT/ML Subcutaneous Solution Pen-injector; Inject 12 units @    breakfastk, 12u at lunch and 14u at supper as directed by your    doctor; Therapy: (Recorded:28Ryj8651) to Recorded   22  Pantoprazole Sodium 40 MG Oral Tablet Delayed Release; Take 1 tablet twice daily; Therapy: 42DAX7840 to (Evaluate:11May2017)  Requested for: 02MVW4717; Last    Rx:42Rhy2428 Ordered   23  Spiriva HandiHaler 18 MCG Inhalation Capsule; USE AS DIRECTED; Therapy: 64Yyx5016 to Recorded   24  Tamsulosin HCl - 0 4 MG Oral Capsule; TAKE ONE CAPSULE BY MOUTH AT BEDTIME; Therapy: (Recorded:17Ife5546) to Recorded   25  Terazosin HCl - 2 MG Oral Capsule; TAKE 2 CAPSULES AT BEDTIME; Therapy: 51LGR3924 to (Beck Crockett)  Requested for: 91OYY0565; Last    Rx:49Sfx4190 Ordered   26  Torsemide 20 MG Oral Tablet; Take 1 tablet daily; Therapy: 14Apr2016-(Last:01Dec2015) Ordered   27  Vitamin B-12 TABS; 2,000 mcg daily; Therapy: (Recorded:19Jan2016) to Recorded   28  Vitamin D3 1000 UNIT Oral Tablet; Therapy: (Recorded:74Hvz0076) to Recorded  Medication List Reviewed: The medication list was reviewed and updated today  Allergies    1  No Known Drug Allergies    2  No Known Environmental Allergies   3  No Known Food Allergies    Vitals  Vital Signs [Data Includes: Current Encounter]    Recorded: [id number] 12:59PM Recorded: 57EVB5274 12:56PM   Heart Rate 74    Systolic 452    Diastolic 52    Height  8 ft 10 in   Weight  186 lb 8 0 oz   BMI Calculated  11 67   BSA Calculated  2 74     Physical Exam    Constitutional   General appearance: No acute distress, well appearing and well nourished  Eyes   Conjunctiva and lids: No swelling, erythema, or discharge  Pupils: Equal, round and reactive to light  The sclera are anicteric  Extraocular movements are intact      Ears, Nose, Mouth, and Throat   External inspection of ears, nose and lips: Normal     Oropharynx: Normal with no erythema, edema, exudate or lesions  Exam of Head: The head is atraumatic and normocephalic  Neck: The neck is supple  The thyroid is normal in size with no palpable nodules  Pulmonary   Respiratory effort: Abnormal     Auscultation of lungs: Abnormal     Cardiovascular   Auscultation of heart: Normal rate and rhythm with no murmurs, gallops or rubs  Examination of pulses: Dorsalis pedal pulses are +2 and equal bilaterally  Examination of Carotids: No bruits  Abdomen   Abdomen: Abdomen is soft, non-tender with normal bowel sounds  Lymphatic   Palpation of lymph nodes: No supraclavicular or suboccipital lymphadenopathy  Musculoskeletal   Inspection/palpation of joints, bones, and muscles: Muscle bulk and tone is normal   Kyphosis  Skin   Skin and subcutaneous tissue: Normal skin temperature and color  Neurologic   Reflexes: 2+ and symmetric  Motor Strength: Strength is 5/5 bilaterally  Psychiatric   Orientation to person, place and time: Normal     Mood and affect: Affect and attention span are normal     Additional Exam:  wearing diabetic shoe in left  Results/Data  Encounter Results   Fingerstick - POC 78HNA8971 01:09PM Eva Gutiérrez     Test Name Result Flag Reference   Glucose Finger Stick 138       Diagnostic Studies Reviewed: I personally reviewed the films/images/results in the office today  My interpretation follows   Results   (1) PTH N-TERMINAL (INTACT) 22Apr2016 07:43AM Colan Fought    Order Number: [id number]     Order Number: [id number]     Test Name Result Flag Reference   PARATHYROID HORMONE INTACT 84 1 pg/mL H 14 0-72 0     (1) RENAL FUNCTION PANEL 22Apr2016 07:43AM Michelle Soria,  [address] Order Number: [id number]     Order Number: [id number] Order Number: [id number] Order Number: [id number] Order Number: [id number] Order Number: [id number]  National Kidney Disease Education Program recommendations are as follows:  GFR calculation is accurate only with a steady state creatinine  Chronic Kidney disease less than 60 ml/min/1 73 sq  meters  Kidney failure less than 15 ml/min/1 73 sq  meters  Test Name Result Flag Reference   ANION GAP (CALC) 7 mmol/L  4-13   BLOOD UREA NITROGEN 49 mg/dL H 5-25   CALCIUM 8 2 mg/dL L 8 3-10 1   CHLORIDE 101 mmol/L  100-108   CARBON DIOXIDE 33 mmol/L H 21-32   CREATININE 1 49 mg/dL H 0 60-1 30   Standardized to IDMS reference method   GLUCOSE,RANDM 126 mg/dL     If the patient is fasting, the ADA then defines impaired fasting glucose as > 100 mg/dL and diabetes as > or equal to 123 mg/dL  POTASSIUM 3 7 mmol/L  3 5-5 3   eGFR Non-African American 45 2 ml/min/1 73sq m     SODIUM 141 mmol/L  136-145   PHOSPHORUS 3 2 mg/dL  2 3-4 1     (1) VITAMIN D 25-HYDROXY 22Apr2016 07:43AM Colan Fought   TW Order Number: [id number]    TW Order Number: [id number]     Test Name Result Flag Reference   VIT D 25-HYDROX 31 0 ng/mL  30 0-100 0     (1) HEMOGLOBIN A1C 22Apr2016 07:43AM Colan Fought   TW Order Number: [id number]      5 7-6 4% impaired fasting glucose  >=6 5% diagnosis of diabetes    Falsely low levels are seen in conditions linked to short RBC life span-  hemolytic anemia, and splenomegaly  Falsely elevated levels are seen in situations where there is an increased production of RBC- receipt of erythropoietin or blood transfusions  Adopted from ADA-Clinical Practice Recommendations     Test Name Result Flag Reference   HEMOGLOBIN A1C 8 0 % H 4 0-5 6   EST  AVG   GLUCOSE 183 mg/dl       (1) LIPID PANEL, FASTING 22Apr2016 07:43AM Colan Fought   TW Order Number: [id number]    TW Order Number: [id number] Order Number: [id number] Order Number: [id number] Order Number: [id number] Order Number: [id number]  Triglyceride:         Normal              <150 mg/dl       Borderline High    150-199 mg/dl       High               200-499 mg/dl       Very High >499 mg/dl  Cholesterol:         Desirable        <200 mg/dl      Borderline High  200-239 mg/dl      High             >239 mg/dl  HDL Cholesterol:        High    >59 mg/dL      Low     <41 mg/dL  LDL CALCULATED:    This screening LDL is a calculated result  It does not have the accuracy of the Direct Measured LDL in the monitoring of patients with hyperlipidemia and/or statin therapy  Direct Measure LDL (ABR743) must be ordered separately in these patients  Test Name Result Flag Reference   CHOLESTEROL 134 mg/dL     HDL,DIRECT 50 mg/dL  40-60   Specimen collection should occur prior to Metamizole administration due to the potential for falsely depressed results  LDL CHOLESTEROL CALCULATED 68 mg/dL  0-100   TRIGLYCERIDES 78 mg/dL  <=150   Specimen collection should occur prior to N-Acetylcysteine or Metamizole administration due to the potential for falsely depressed results       (1) HEPATIC FUNCTION PANEL 22Apr2016 07:43AM Nilson Yannick    Order Number: [id number]     Order Number: [id number] Order Number: [id number] Order Number: [id number] Order Number: [id number] Order Number: [id number]     Test Name Result Flag Reference   ALBUMIN 3 2 g/dL L 3 5-5 0   ALK PHOSPHATAS 111 U/L     ALT (SGPT) 33 U/L  12-78   AST(SGOT) 31 U/L  5-45   BILI, DIRECT 0 28 mg/dL H 0 00-0 20   BILI, TOTAL 0 82 mg/dL  0 20-1 00   TOTAL PROTEIN 6 7 g/dL  6 4-8 2     (1) TSH WITH FT4 REFLEX 22Apr2016 07:43AM Nilson Lanier    Order Number: [id number]    TW Order Number: [id number] Order Number: [id number] Order Number: [id number] Order Number: [id number] Order Number: [id number]     Test Name Result Flag Reference   TSH 1 290 uIU/mL  0 358-3 740     Future Appointments    Date/Time Provider Specialty Site   05/24/2016 09:30 AM Chani Valadez, DO Internal Medicine [facility] INTERNAL MED   06/14/2016 10:00 AM Chani Valadez, DO Internal Medicine [facility] INTERNAL MED   06/16/2016 09:00 AM JASSON Blue   Vascular Surgery [facility] NEUROSURGICAL   07/19/2016 09:30 AM Sara Bolaños MD Neurology [address]     Signatures   Electronically signed by : JASSON Guo ; May 20 2016  1:32PM EST                       (Author)